# Patient Record
Sex: MALE | Race: BLACK OR AFRICAN AMERICAN | NOT HISPANIC OR LATINO | Employment: OTHER | URBAN - METROPOLITAN AREA
[De-identification: names, ages, dates, MRNs, and addresses within clinical notes are randomized per-mention and may not be internally consistent; named-entity substitution may affect disease eponyms.]

---

## 2023-09-26 ENCOUNTER — HOSPITAL ENCOUNTER (OUTPATIENT)
Dept: DATA CONVERSION | Facility: HOSPITAL | Age: 88
End: 2023-09-26
Attending: INTERNAL MEDICINE | Admitting: INTERNAL MEDICINE
Payer: MEDICARE

## 2023-09-26 DIAGNOSIS — I50.9 HEART FAILURE, UNSPECIFIED (MULTI): ICD-10-CM

## 2023-09-26 DIAGNOSIS — Z45.09 ENCOUNTER FOR ADJUSTMENT AND MANAGEMENT OF OTHER CARDIAC DEVICE: ICD-10-CM

## 2023-09-26 DIAGNOSIS — I47.20 VENTRICULAR TACHYCARDIA, UNSPECIFIED (MULTI): ICD-10-CM

## 2023-09-26 DIAGNOSIS — Z95.810 PRESENCE OF AUTOMATIC (IMPLANTABLE) CARDIAC DEFIBRILLATOR: ICD-10-CM

## 2023-09-26 LAB
ACTIVATED PARTIAL THROMBOPLASTIN TIME IN PPP BY COAGULATION ASSAY: 31 SEC (ref 27–38)
ANION GAP IN SER/PLAS: 10 MMOL/L (ref 10–20)
CALCIUM (MG/DL) IN SER/PLAS: 8.9 MG/DL (ref 8.6–10.3)
CARBON DIOXIDE, TOTAL (MMOL/L) IN SER/PLAS: 26 MMOL/L (ref 21–32)
CHLORIDE (MMOL/L) IN SER/PLAS: 107 MMOL/L (ref 98–107)
CREATININE (MG/DL) IN SER/PLAS: 1.47 MG/DL (ref 0.5–1.3)
ERYTHROCYTE DISTRIBUTION WIDTH (RATIO) BY AUTOMATED COUNT: 13.2 % (ref 11.5–14.5)
ERYTHROCYTE MEAN CORPUSCULAR HEMOGLOBIN CONCENTRATION (G/DL) BY AUTOMATED: 33.1 G/DL (ref 32–36)
ERYTHROCYTE MEAN CORPUSCULAR VOLUME (FL) BY AUTOMATED COUNT: 107 FL (ref 80–100)
ERYTHROCYTES (10*6/UL) IN BLOOD BY AUTOMATED COUNT: 2.71 X10E12/L (ref 4.5–5.9)
GFR MALE: 45 ML/MIN/1.73M2
GLUCOSE (MG/DL) IN SER/PLAS: 86 MG/DL (ref 74–99)
HEMATOCRIT (%) IN BLOOD BY AUTOMATED COUNT: 29 % (ref 41–52)
HEMOGLOBIN (G/DL) IN BLOOD: 9.6 G/DL (ref 13.5–17.5)
INR IN PPP BY COAGULATION ASSAY: 1.2 (ref 0.9–1.1)
LEUKOCYTES (10*3/UL) IN BLOOD BY AUTOMATED COUNT: 10.9 X10E9/L (ref 4.4–11.3)
PLATELETS (10*3/UL) IN BLOOD AUTOMATED COUNT: 210 X10E9/L (ref 150–450)
POTASSIUM (MMOL/L) IN SER/PLAS: 4.1 MMOL/L (ref 3.5–5.3)
PROTHROMBIN TIME (PT) IN PPP BY COAGULATION ASSAY: 13 SEC (ref 9.8–12.8)
SODIUM (MMOL/L) IN SER/PLAS: 139 MMOL/L (ref 136–145)
UREA NITROGEN (MG/DL) IN SER/PLAS: 24 MG/DL (ref 6–23)

## 2023-09-29 VITALS — BODY MASS INDEX: 24.18 KG/M2 | WEIGHT: 145.28 LBS

## 2023-09-30 NOTE — H&P
History & Physical Reviewed:   I have reviewed the History and Physical dated:  08-Sep-2023   History and Physical reviewed and relevant findings noted. Patient examined to review pertinent physical  findings.: No significant changes   Home Medications Reviewed: no changes noted   Allergies Reviewed: no changes noted       Airway/Sedation Assessment:  ·  Mouth Opening OK yes   ·  Neck Flexibility OK yes   ·  Loose Teeth no   ·  Oropharyngeal Classification Class II       ERAS (Enhanced Recovery After Surgery):  ·  ERAS Patient: no     Consent:   COVID-19 Consent:  ·  COVID-19 Risk Consent Surgeon has reviewed key risks related to the risk of vivian COVID-19 and if they contract COVID-19 what the risks are.       Electronic Signatures:  Michael Rivera)  (Signed 26-Sep-2023 09:20)   Authored: History & Physical Reviewed, Airway/Sedation,  ERAS, Consent, Note Completion      Last Updated: 26-Sep-2023 09:20 by Michael Rivera)

## 2023-10-01 PROBLEM — Z95.810 ICD (IMPLANTABLE CARDIOVERTER-DEFIBRILLATOR) IN PLACE: Status: ACTIVE | Noted: 2023-10-01

## 2023-10-01 PROBLEM — M50.30 DDD (DEGENERATIVE DISC DISEASE), CERVICAL: Status: ACTIVE | Noted: 2023-10-01

## 2023-10-01 PROBLEM — R42 DIZZINESS: Status: ACTIVE | Noted: 2023-10-01

## 2023-10-01 PROBLEM — E87.5 HYPERKALEMIA: Status: ACTIVE | Noted: 2023-10-01

## 2023-10-01 PROBLEM — N18.31 STAGE 3A CHRONIC KIDNEY DISEASE (MULTI): Status: ACTIVE | Noted: 2023-10-01

## 2023-10-01 PROBLEM — G47.00 INSOMNIA: Status: ACTIVE | Noted: 2023-10-01

## 2023-10-01 PROBLEM — G89.29 CHRONIC RIGHT SHOULDER PAIN: Status: ACTIVE | Noted: 2023-10-01

## 2023-10-01 PROBLEM — K21.9 GERD (GASTROESOPHAGEAL REFLUX DISEASE): Status: ACTIVE | Noted: 2023-10-01

## 2023-10-01 PROBLEM — I10 HYPERTENSION: Status: ACTIVE | Noted: 2023-10-01

## 2023-10-01 PROBLEM — R07.9 CHEST PAIN: Status: ACTIVE | Noted: 2023-10-01

## 2023-10-01 PROBLEM — N28.9 RENAL INSUFFICIENCY: Status: ACTIVE | Noted: 2023-10-01

## 2023-10-01 PROBLEM — K59.09 CHRONIC CONSTIPATION: Status: ACTIVE | Noted: 2023-10-01

## 2023-10-01 PROBLEM — Z95.0 PACEMAKER: Status: ACTIVE | Noted: 2023-10-01

## 2023-10-01 PROBLEM — Z91.81 AT RISK FOR FALL DUE TO COMORBID CONDITION: Status: ACTIVE | Noted: 2023-10-01

## 2023-10-01 PROBLEM — D64.9 ANEMIA: Status: ACTIVE | Noted: 2023-10-01

## 2023-10-01 PROBLEM — G40.909 SEIZURE DISORDER (MULTI): Status: ACTIVE | Noted: 2023-10-01

## 2023-10-01 PROBLEM — M25.511 CHRONIC RIGHT SHOULDER PAIN: Status: ACTIVE | Noted: 2023-10-01

## 2023-10-01 PROBLEM — G62.9 PERIPHERAL NEUROPATHY: Status: ACTIVE | Noted: 2023-10-01

## 2023-10-01 PROBLEM — I10 ORTHOSTATIC HYPERTENSION: Status: ACTIVE | Noted: 2023-10-01

## 2023-10-01 PROBLEM — N40.0 BENIGN ENLARGEMENT OF PROSTATE: Status: ACTIVE | Noted: 2023-10-01

## 2023-10-01 PROBLEM — N18.2 CHRONIC KIDNEY DISEASE, STAGE 2 (MILD): Status: ACTIVE | Noted: 2023-10-01

## 2023-10-01 PROBLEM — I50.1 CHRONIC LEFT-SIDED CHF (CONGESTIVE HEART FAILURE) (MULTI): Status: ACTIVE | Noted: 2023-10-01

## 2023-10-01 PROBLEM — I25.10 CAD (CORONARY ARTERY DISEASE): Status: ACTIVE | Noted: 2023-10-01

## 2023-10-01 PROBLEM — M25.611 STIFFNESS OF RIGHT SHOULDER JOINT: Status: ACTIVE | Noted: 2023-10-01

## 2023-10-01 PROBLEM — I42.9 CARDIOMYOPATHY (MULTI): Status: ACTIVE | Noted: 2023-10-01

## 2023-10-01 PROBLEM — I47.29 PAROXYSMAL VENTRICULAR TACHYCARDIA (MULTI): Status: ACTIVE | Noted: 2023-10-01

## 2023-10-01 PROBLEM — R13.13 DYSPHAGIA, CRICOPHARYNGEAL: Status: ACTIVE | Noted: 2023-10-01

## 2023-10-01 PROBLEM — F41.8 ANXIETY WITH DEPRESSION: Status: ACTIVE | Noted: 2023-10-01

## 2023-10-01 PROBLEM — F03.B0 MODERATE DEMENTIA (MULTI): Status: ACTIVE | Noted: 2023-10-01

## 2023-10-01 PROBLEM — M75.00 FROZEN SHOULDER: Status: ACTIVE | Noted: 2023-10-01

## 2023-10-01 PROBLEM — N28.9 KIDNEY DAMAGE: Status: ACTIVE | Noted: 2023-10-01

## 2023-10-01 PROBLEM — R03.0 ELEVATED BLOOD PRESSURE READING: Status: ACTIVE | Noted: 2023-10-01

## 2023-10-01 PROBLEM — I48.91 ATRIAL FIBRILLATION (MULTI): Status: ACTIVE | Noted: 2023-10-01

## 2023-10-01 PROBLEM — I77.9 BILATERAL CAROTID ARTERY DISEASE (CMS-HCC): Status: ACTIVE | Noted: 2023-10-01

## 2023-10-01 PROBLEM — I47.20 PAROXYSMAL VENTRICULAR TACHYCARDIA: Status: ACTIVE | Noted: 2023-10-01

## 2023-10-01 PROBLEM — E61.1 IRON DEFICIENCY: Status: ACTIVE | Noted: 2023-10-01

## 2023-10-01 PROBLEM — F45.9 PSYCHOSOMATIC DISORDER: Status: ACTIVE | Noted: 2023-10-01

## 2023-10-01 PROBLEM — E53.8 VITAMIN B12 DEFICIENCY: Status: ACTIVE | Noted: 2023-10-01

## 2023-10-01 PROBLEM — I50.20 SYSTOLIC CONGESTIVE HEART FAILURE, NYHA CLASS 2 (MULTI): Status: ACTIVE | Noted: 2023-10-01

## 2023-10-01 PROBLEM — E55.9 VITAMIN D DEFICIENCY DISEASE: Status: ACTIVE | Noted: 2023-10-01

## 2023-10-01 PROBLEM — F41.9 ANXIETY: Status: ACTIVE | Noted: 2023-10-01

## 2023-10-01 PROBLEM — R29.6 UNWITNESSED FALL: Status: ACTIVE | Noted: 2023-10-01

## 2023-10-01 PROBLEM — R09.89 LABILE HYPERTENSION: Status: ACTIVE | Noted: 2023-10-01

## 2023-10-01 PROBLEM — R33.9 URINARY RETENTION: Status: ACTIVE | Noted: 2023-10-01

## 2023-10-01 PROBLEM — I95.9 HYPOTENSION (ARTERIAL): Status: ACTIVE | Noted: 2023-10-01

## 2023-10-01 RX ORDER — HYDRALAZINE HYDROCHLORIDE 50 MG/1
50 TABLET, FILM COATED ORAL 2 TIMES DAILY
COMMUNITY
Start: 2022-12-07 | End: 2023-11-24 | Stop reason: SDUPTHER

## 2023-10-01 RX ORDER — PHENAZOPYRIDINE HYDROCHLORIDE 200 MG/1
200 TABLET, FILM COATED ORAL 3 TIMES DAILY
COMMUNITY
Start: 2022-04-15 | End: 2023-10-18 | Stop reason: ALTCHOICE

## 2023-10-01 RX ORDER — PANTOPRAZOLE SODIUM 40 MG/1
1 TABLET, DELAYED RELEASE ORAL DAILY
COMMUNITY
End: 2024-03-19 | Stop reason: SDUPTHER

## 2023-10-01 RX ORDER — HYDRALAZINE HYDROCHLORIDE 100 MG/1
100 TABLET, FILM COATED ORAL 2 TIMES DAILY
COMMUNITY
End: 2023-10-18 | Stop reason: ALTCHOICE

## 2023-10-01 RX ORDER — ASPIRIN 325 MG
1 TABLET, DELAYED RELEASE (ENTERIC COATED) ORAL
COMMUNITY
Start: 2022-07-19

## 2023-10-01 RX ORDER — ATORVASTATIN CALCIUM 80 MG/1
1 TABLET, FILM COATED ORAL NIGHTLY
COMMUNITY
Start: 2022-01-18 | End: 2024-02-21 | Stop reason: SDUPTHER

## 2023-10-01 RX ORDER — NITROGLYCERIN 0.4 MG/1
0.4 TABLET SUBLINGUAL EVERY 5 MIN PRN
COMMUNITY
End: 2024-02-21 | Stop reason: SDUPTHER

## 2023-10-01 RX ORDER — FUROSEMIDE 20 MG/1
1 TABLET ORAL DAILY
COMMUNITY
Start: 2022-12-07 | End: 2023-10-18 | Stop reason: ALTCHOICE

## 2023-10-01 RX ORDER — ONDANSETRON 4 MG/1
4 TABLET, FILM COATED ORAL 3 TIMES DAILY PRN
COMMUNITY

## 2023-10-01 RX ORDER — MIRTAZAPINE 15 MG/1
15 TABLET, FILM COATED ORAL NIGHTLY
COMMUNITY

## 2023-10-01 RX ORDER — HYDROCODONE BITARTRATE AND ACETAMINOPHEN 5; 325 MG/1; MG/1
1 TABLET ORAL AS NEEDED
COMMUNITY
End: 2023-10-18 | Stop reason: ALTCHOICE

## 2023-10-01 RX ORDER — FOLIC ACID 1 MG/1
1 TABLET ORAL DAILY
COMMUNITY

## 2023-10-01 RX ORDER — ACETAMINOPHEN 500 MG
500 TABLET ORAL AS NEEDED
COMMUNITY
End: 2023-10-18 | Stop reason: ALTCHOICE

## 2023-10-01 RX ORDER — CARVEDILOL 6.25 MG/1
1 TABLET ORAL
COMMUNITY
End: 2024-02-21 | Stop reason: SDUPTHER

## 2023-10-01 RX ORDER — MECLIZINE HYDROCHLORIDE 25 MG/1
1 TABLET ORAL 3 TIMES DAILY PRN
COMMUNITY
End: 2023-10-18 | Stop reason: ALTCHOICE

## 2023-10-01 RX ORDER — PEPPERMINT OIL 90 MG
1 CAPSULE, DELAYED, AND EXTENDED RELEASE ORAL EVERY MORNING
COMMUNITY

## 2023-10-01 RX ORDER — DIGOXIN 125 MCG
1 TABLET ORAL DAILY
COMMUNITY
End: 2024-01-15 | Stop reason: SDUPTHER

## 2023-10-01 RX ORDER — DICYCLOMINE HYDROCHLORIDE 10 MG/1
10 CAPSULE ORAL EVERY 6 HOURS PRN
COMMUNITY
End: 2023-10-18 | Stop reason: ALTCHOICE

## 2023-10-01 RX ORDER — ASPIRIN 81 MG/1
1 TABLET ORAL DAILY
COMMUNITY
End: 2024-02-21 | Stop reason: SDUPTHER

## 2023-10-01 RX ORDER — DOCUSATE SODIUM 100 MG/1
100 CAPSULE, LIQUID FILLED ORAL DAILY PRN
COMMUNITY

## 2023-10-01 RX ORDER — DIVALPROEX SODIUM 500 MG/1
500 TABLET, DELAYED RELEASE ORAL 3 TIMES DAILY
COMMUNITY

## 2023-10-02 LAB
ATRIAL RATE: 71 BPM
P OFFSET: 185 MS
P ONSET: 158 MS
PR INTERVAL: 182 MS
Q ONSET: 199 MS
QRS COUNT: 11 BEATS
QRS DURATION: 140 MS
QT INTERVAL: 410 MS
QTC CALCULATION(BAZETT): 445 MS
QTC FREDERICIA: 433 MS
R AXIS: -83 DEGREES
T AXIS: 55 DEGREES
T OFFSET: 404 MS
VENTRICULAR RATE: 71 BPM

## 2023-10-03 ENCOUNTER — CLINICAL SUPPORT (OUTPATIENT)
Dept: CARDIOLOGY | Facility: CLINIC | Age: 88
End: 2023-10-03
Payer: MEDICARE

## 2023-10-03 DIAGNOSIS — I50.1 CHRONIC LEFT-SIDED CHF (CONGESTIVE HEART FAILURE) (MULTI): ICD-10-CM

## 2023-10-03 DIAGNOSIS — Z95.810 ICD (IMPLANTABLE CARDIOVERTER-DEFIBRILLATOR) IN PLACE: ICD-10-CM

## 2023-10-03 DIAGNOSIS — I42.9 CARDIOMYOPATHY, UNSPECIFIED TYPE (MULTI): ICD-10-CM

## 2023-10-03 NOTE — PROGRESS NOTES
Pt. here for wound check of ICD replacement by Dr. Rivera on 9/26/2023 at [facility]. R clavicular area is well approximated with no erythema or drainage. Pt. denies any fever or chills. Temp 97.8

## 2023-10-18 ENCOUNTER — OFFICE VISIT (OUTPATIENT)
Dept: PRIMARY CARE | Facility: CLINIC | Age: 88
End: 2023-10-18
Payer: MEDICARE

## 2023-10-18 VITALS
BODY MASS INDEX: 24.62 KG/M2 | HEART RATE: 88 BPM | WEIGHT: 147.8 LBS | SYSTOLIC BLOOD PRESSURE: 118 MMHG | DIASTOLIC BLOOD PRESSURE: 78 MMHG | HEIGHT: 65 IN | TEMPERATURE: 98.6 F

## 2023-10-18 DIAGNOSIS — I25.5 ISCHEMIC CARDIOMYOPATHY: ICD-10-CM

## 2023-10-18 DIAGNOSIS — I10 PRIMARY HYPERTENSION: ICD-10-CM

## 2023-10-18 DIAGNOSIS — I25.118 CORONARY ARTERY DISEASE OF NATIVE ARTERY OF NATIVE HEART WITH STABLE ANGINA PECTORIS (CMS-HCC): Primary | ICD-10-CM

## 2023-10-18 DIAGNOSIS — Z95.810 ICD (IMPLANTABLE CARDIOVERTER-DEFIBRILLATOR) IN PLACE: ICD-10-CM

## 2023-10-18 DIAGNOSIS — Z23 ENCOUNTER FOR IMMUNIZATION: ICD-10-CM

## 2023-10-18 PROCEDURE — 3078F DIAST BP <80 MM HG: CPT | Performed by: INTERNAL MEDICINE

## 2023-10-18 PROCEDURE — 3074F SYST BP LT 130 MM HG: CPT | Performed by: INTERNAL MEDICINE

## 2023-10-18 PROCEDURE — 99213 OFFICE O/P EST LOW 20 MIN: CPT | Performed by: INTERNAL MEDICINE

## 2023-10-18 PROCEDURE — 1036F TOBACCO NON-USER: CPT | Performed by: INTERNAL MEDICINE

## 2023-10-18 ASSESSMENT — PATIENT HEALTH QUESTIONNAIRE - PHQ9
2. FEELING DOWN, DEPRESSED OR HOPELESS: NOT AT ALL
1. LITTLE INTEREST OR PLEASURE IN DOING THINGS: NOT AT ALL
SUM OF ALL RESPONSES TO PHQ9 QUESTIONS 1 AND 2: 0

## 2023-10-18 ASSESSMENT — ENCOUNTER SYMPTOMS
LOSS OF SENSATION IN FEET: 0
OCCASIONAL FEELINGS OF UNSTEADINESS: 0
DEPRESSION: 0

## 2023-10-22 PROCEDURE — 90662 IIV NO PRSV INCREASED AG IM: CPT | Performed by: INTERNAL MEDICINE

## 2023-10-22 PROCEDURE — G0008 ADMIN INFLUENZA VIRUS VAC: HCPCS | Performed by: INTERNAL MEDICINE

## 2023-10-22 ASSESSMENT — ENCOUNTER SYMPTOMS
SORE THROAT: 0
COUGH: 0
DYSURIA: 0
ACTIVITY CHANGE: 0
MYALGIAS: 0
ABDOMINAL PAIN: 0
LIGHT-HEADEDNESS: 0

## 2023-10-23 NOTE — PROGRESS NOTES
"SUBJECTIVE:   Oneil William is a 89 y.o. male who presents for Follow-up (PATIENT HERE FOR ONE MONTH FOLLOW-UP.).    HISTORY OF PRESENT ILLNESS:  Oneil William  is a pleasant 89-year-old gentleman comes with his daughter.  He does have coronary disease, hypertension and cardiomyopathy.  He is overall doing well.  He does not have any acute medical complaint at this time.  Patient has nonspecific aches and pains especially bilateral shoulder joints.  He does have sedentary lifestyle.  We discussed about having some freehand exercises.  He does not need any medications refilled.  He sees cardiology mostly for his cardiac health.  He did not have any recent ER visit.  He is due for his annual flu shot.      Review of Systems   Constitutional:  Negative for activity change.   HENT:  Negative for congestion and sore throat.    Respiratory:  Negative for cough.    Cardiovascular:  Negative for chest pain.   Gastrointestinal:  Negative for abdominal pain.   Endocrine: Negative for polyuria.   Genitourinary:  Negative for dysuria.   Musculoskeletal:  Negative for myalgias.   Skin:  Negative for rash.   Neurological:  Negative for light-headedness.   Psychiatric/Behavioral:  Negative for behavioral problems.        TODAY's OFFICE VITALS:   Visit Vitals  /78 (BP Location: Left arm, Patient Position: Sitting, BP Cuff Size: Adult)   Pulse 88   Temp 37 °C (98.6 °F) (Temporal)   Ht 1.651 m (5' 5\")   Wt 67 kg (147 lb 12.8 oz)   BMI 24.60 kg/m²   Smoking Status Never   BSA 1.75 m²        Physical Exam  Vitals and nursing note reviewed.   Constitutional:       Appearance: Normal appearance.   HENT:      Head: Normocephalic.      Right Ear: Tympanic membrane normal.      Left Ear: Tympanic membrane normal.      Nose: Nose normal.      Mouth/Throat:      Mouth: Mucous membranes are moist.   Cardiovascular:      Rate and Rhythm: Normal rate and regular rhythm.      Pulses: Normal pulses.      Heart sounds: No murmur heard.  Pulmonary:      " Effort: No respiratory distress.      Breath sounds: Normal breath sounds.   Abdominal:      Palpations: Abdomen is soft.   Musculoskeletal:      Cervical back: Neck supple.      Right lower leg: No edema.      Left lower leg: No edema.   Skin:     General: Skin is warm.      Findings: No rash.   Neurological:      General: No focal deficit present.      Mental Status: He is alert and oriented to person, place, and time.   Psychiatric:         Mood and Affect: Mood normal.          MEDICATIONS:   Current Outpatient Medications on File Prior to Visit   Medication Sig Dispense Refill    aspirin 81 mg EC tablet Take 1 tablet (81 mg) by mouth once daily.      atorvastatin (Lipitor) 80 mg tablet Take 1 tablet (80 mg) by mouth once daily at bedtime.      amilcar oil-levomenthol (FDgard) 25-20.75 mg capsule Take 1 capsule by mouth once daily in the morning. may take additonal tablet in the evening if needed      carvedilol (Coreg) 6.25 mg tablet Take 1 tablet (6.25 mg) by mouth 2 times a day with meals.      cholecalciferol (Vitamin D-3) 1,250 mcg (50,000 unit) capsule Take 1 capsule (50,000 Units) by mouth 1 (one) time per week.      digoxin (Lanoxin) 125 MCG tablet Take 1 tablet (125 mcg) by mouth once daily.      divalproex (Depakote) 500 mg EC tablet Take 1 tablet (500 mg) by mouth 3 times a day. AFTER MEALS.      docusate sodium (Colace) 100 mg capsule Take 1 capsule (100 mg) by mouth once daily as needed.      folic acid (Folvite) 1 mg tablet Take 1 tablet (1 mg) by mouth once daily.      hydrALAZINE (Apresoline) 50 mg tablet Take 1 tablet (50 mg) by mouth 2 times a day. if Systolic BP >170 he can take 100mg bid if Systolic BP <100 Hold      mirtazapine (Remeron) 15 mg tablet Take 1 tablet (15 mg) by mouth once daily at bedtime.      multivit-min/ferrous fumarate (MULTI VITAMIN ORAL) Take 1 tablet by mouth once daily.      nitroglycerin (Nitrostat) 0.4 mg SL tablet Place 1 tablet (0.4 mg) under the tongue every 5  minutes if needed for chest pain. DO NOT EXCEED A TOTAL OF 3 DOSES IN 15 MINUTES. CALL 911 AFTER 3RD DOSE      ondansetron (Zofran) 4 mg tablet Take 1 tablet (4 mg) by mouth 3 times a day as needed for nausea.      pantoprazole (ProtoNix) 40 mg EC tablet Take 1 tablet (40 mg) by mouth once daily.      [DISCONTINUED] acetaminophen (Mapap Extra Strength) 500 mg tablet Take 1 tablet (500 mg) by mouth if needed. EVERY 4 TO 6 HOURS      [DISCONTINUED] dicyclomine (Bentyl) 10 mg capsule Take 1 capsule (10 mg) by mouth every 6 hours if needed.      [DISCONTINUED] furosemide (Lasix) 20 mg tablet Take 1 tablet (20 mg) by mouth once daily.      [DISCONTINUED] hydrALAZINE (Apresoline) 100 mg tablet Take 1 tablet (100 mg) by mouth 2 times a day.      [DISCONTINUED] HYDROcodone-acetaminophen (Norco) 5-325 mg tablet Take 1 tablet by mouth if needed (pain). Every 4 to 6 hours      [DISCONTINUED] meclizine (Antivert) 25 mg tablet Take 1 tablet (25 mg) by mouth 3 times a day as needed.      [DISCONTINUED] phenazopyridine (Pyridium) 200 mg tablet Take 1 tablet (200 mg) by mouth 3 times a day.       No current facility-administered medications on file prior to visit.        TODAY'S VISIT  DX:   1. Coronary artery disease of native artery of native heart with stable angina pectoris (CMS/HCC)        2. Encounter for immunization  Flu vaccine, quadrivalent, high-dose, preservative free, age 65y+ (FLUZONE)      3. Primary hypertension        4. ICD (implantable cardioverter-defibrillator) in place        5. Ischemic cardiomyopathy               MEDICAL DECISION MAKING:  Recent lab work and relevant imaging studies have been reviewed.  Relevant correspondence/notes from specialty consultants were reviewed and discussed with patient.  The current active medical co morbidities have been considered.  Patient received annual flu vaccine today in the office.. Medication have been sent for refill.  Patient will continue with current medical  therapy and plan. I will see patient back in 3 months.

## 2023-10-26 ENCOUNTER — TELEMEDICINE (OUTPATIENT)
Dept: PRIMARY CARE | Facility: CLINIC | Age: 88
End: 2023-10-26
Payer: MEDICARE

## 2023-10-26 DIAGNOSIS — I10 PRIMARY HYPERTENSION: Primary | ICD-10-CM

## 2023-10-26 PROCEDURE — 99442 PR PHYS/QHP TELEPHONE EVALUATION 11-20 MIN: CPT | Performed by: INTERNAL MEDICINE

## 2023-10-26 ASSESSMENT — PATIENT HEALTH QUESTIONNAIRE - PHQ9
SUM OF ALL RESPONSES TO PHQ9 QUESTIONS 1 AND 2: 0
2. FEELING DOWN, DEPRESSED OR HOPELESS: NOT AT ALL
1. LITTLE INTEREST OR PLEASURE IN DOING THINGS: NOT AT ALL

## 2023-11-01 ASSESSMENT — ENCOUNTER SYMPTOMS
PALPITATIONS: 1
MYALGIAS: 0
LIGHT-HEADEDNESS: 0
COUGH: 0
SORE THROAT: 0
ACTIVITY CHANGE: 0
DYSURIA: 0
ABDOMINAL PAIN: 0

## 2023-11-02 NOTE — PROGRESS NOTES
In SUBJECTIVE:   Oneil William is a 89 y.o. male who presents for Palpitations (Patient having telecommunication today for palpitations since getting his flu shot last week. ).    HISTORY OF PRESENT ILLNESS:  Oneil William  is a pleasant 89-year-old -American gentleman has been having palpitation.  Sometimes he gets anxious and his heart races.  He has multiple ER visits for similar symptoms.  This time he does not have any chest pain.  His daughter checked his blood pressure which has been slightly elevated at 160 mmHg.  He took his morning antihypertensive medications.  However blood pressure remained high.  This is making him also somewhat anxious.  He sees Dr. Dillon for his cardiac need.  Patient does have low ejection fraction congestive heart failure.  He has a AICD in situ.    Palpitations   Pertinent negatives include no chest pain or coughing.       Review of Systems   Constitutional:  Negative for activity change.   HENT:  Negative for congestion and sore throat.    Respiratory:  Negative for cough.    Cardiovascular:  Positive for palpitations. Negative for chest pain.   Gastrointestinal:  Negative for abdominal pain.   Endocrine: Negative for polyuria.   Genitourinary:  Negative for dysuria.   Musculoskeletal:  Negative for myalgias.   Skin:  Negative for rash.   Neurological:  Negative for light-headedness.   Psychiatric/Behavioral:  Negative for behavioral problems.         Wt Readings from Last 10 Encounters:   10/18/23 67 kg (147 lb 12.8 oz)   06/26/23 68.9 kg (152 lb)   06/06/23 68 kg (150 lb)   05/09/23 69.9 kg (154 lb)   05/04/23 68.9 kg (152 lb)   04/27/23 67.1 kg (148 lb)   04/03/23 68.5 kg (151 lb)   02/21/23 67.6 kg (149 lb)   02/02/23 65.3 kg (144 lb)   01/10/23 68 kg (150 lb)            Physical Exam  Constitutional:       General: He is not in acute distress.  HENT:      Nose: No rhinorrhea.   Pulmonary:      Effort: Pulmonary effort is normal. No respiratory distress.      Breath sounds: No  stridor. No wheezing.   Neurological:      Mental Status: He is alert.   Psychiatric:         Mood and Affect: Mood normal.         Judgment: Judgment normal.            RECENT LABS:  Lab Results   Component Value Date    WBC 10.9 09/26/2023    HGB 9.6 (L) 09/26/2023    HCT 29.0 (L) 09/26/2023     09/26/2023     09/26/2023    K 4.1 09/26/2023     09/26/2023    CREATININE 1.47 (H) 09/26/2023    BUN 24 (H) 09/26/2023    CO2 26 09/26/2023    INR 1.2 (H) 09/26/2023         MEDICATIONS:   Current Outpatient Medications   Medication Instructions    aspirin 81 mg EC tablet 1 tablet, oral, Daily    atorvastatin (Lipitor) 80 mg tablet 1 tablet, oral, Nightly    amilcar oil-levomenthol (FDgard) 25-20.75 mg capsule 1 capsule, oral, Every morning, may take additonal tablet in the evening if needed<BR>    carvedilol (Coreg) 6.25 mg tablet 1 tablet, oral, 2 times daily with meals    cholecalciferol (Vitamin D-3) 1,250 mcg (50,000 unit) capsule 1 capsule, oral, Weekly    digoxin (Lanoxin) 125 MCG tablet 1 tablet, oral, Daily    divalproex (DEPAKOTE) 500 mg, oral, 3 times daily, AFTER MEALS.    docusate sodium (COLACE) 100 mg, oral, Daily PRN    folic acid (Folvite) 1 mg tablet 1 tablet, oral, Daily    hydrALAZINE (APRESOLINE) 50 mg, oral, 2 times daily, if Systolic BP >170 he can take 100mg bid if Systolic BP <100 Hold<BR>    mirtazapine (REMERON) 15 mg, oral, Nightly    multivit-min/ferrous fumarate (MULTI VITAMIN ORAL) 1 tablet, oral, Daily    nitroglycerin (NITROSTAT) 0.4 mg, sublingual, Every 5 min PRN, DO NOT EXCEED A TOTAL OF 3 DOSES IN 15 MINUTES. CALL 911 AFTER 3RD DOSE    ondansetron (ZOFRAN) 4 mg, oral, 3 times daily PRN    pantoprazole (ProtoNix) 40 mg EC tablet 1 tablet, oral, Daily        TODAY'S VISIT  DX:   1. Primary hypertension               MEDICAL DECISION MAKING:  A. Recent lab work and relevant imaging studies have been reviewed.    B. Relevant correspondence/notes from specialty  consultants were reviewed and discussed with patient.    C. The current active medical co morbidities have been considered.   D.  Patient was reassured.  I advised him to take 1 extra pill of hydralazine 100 mg.  I will give him a call at about 4 PM to recheck his blood pressure.  E. Patient will continue with current medical therapy and plan.   F. I will see patient back in 3 months.

## 2023-11-14 ENCOUNTER — APPOINTMENT (OUTPATIENT)
Dept: CARDIOLOGY | Facility: CLINIC | Age: 88
End: 2023-11-14
Payer: MEDICARE

## 2023-11-24 ENCOUNTER — OFFICE VISIT (OUTPATIENT)
Dept: CARDIOLOGY | Facility: CLINIC | Age: 88
End: 2023-11-24
Payer: MEDICARE

## 2023-11-24 VITALS
SYSTOLIC BLOOD PRESSURE: 166 MMHG | DIASTOLIC BLOOD PRESSURE: 72 MMHG | BODY MASS INDEX: 25.16 KG/M2 | HEIGHT: 65 IN | WEIGHT: 151 LBS | HEART RATE: 72 BPM

## 2023-11-24 DIAGNOSIS — Z95.810 ICD (IMPLANTABLE CARDIOVERTER-DEFIBRILLATOR) IN PLACE: ICD-10-CM

## 2023-11-24 DIAGNOSIS — I10 ORTHOSTATIC HYPERTENSION: ICD-10-CM

## 2023-11-24 DIAGNOSIS — R09.89 LABILE HYPERTENSION: Primary | ICD-10-CM

## 2023-11-24 DIAGNOSIS — N28.9 RENAL INSUFFICIENCY: ICD-10-CM

## 2023-11-24 PROCEDURE — 1036F TOBACCO NON-USER: CPT | Performed by: NURSE PRACTITIONER

## 2023-11-24 PROCEDURE — 1160F RVW MEDS BY RX/DR IN RCRD: CPT | Performed by: NURSE PRACTITIONER

## 2023-11-24 PROCEDURE — 99213 OFFICE O/P EST LOW 20 MIN: CPT | Performed by: NURSE PRACTITIONER

## 2023-11-24 PROCEDURE — 1159F MED LIST DOCD IN RCRD: CPT | Performed by: NURSE PRACTITIONER

## 2023-11-24 PROCEDURE — 3077F SYST BP >= 140 MM HG: CPT | Performed by: NURSE PRACTITIONER

## 2023-11-24 PROCEDURE — 3078F DIAST BP <80 MM HG: CPT | Performed by: NURSE PRACTITIONER

## 2023-11-24 RX ORDER — HYDRALAZINE HYDROCHLORIDE 50 MG/1
TABLET, FILM COATED ORAL
Qty: 30 TABLET | Refills: 6 | Status: SHIPPED | OUTPATIENT
Start: 2023-11-24 | End: 2024-02-21 | Stop reason: SDUPTHER

## 2023-11-24 NOTE — PROGRESS NOTES
Oneil William is a 89 y.o. male that presents to the office today with family member for  cardiac follow up.  He follows with his  primary cardiologist Dr. Dillon and Dr. Rivera  and was added to my schedule today.  PMH as noted below.       He states that his blood pressure has been elevated at home sometimes near 190 systolic which makes him feel poorly.  He states that he has been taking Hydralazine 50 mg PO three times per day but feels as though his evening blood pressure is always excessively elevated. He states that he is eating and drinking well, is avoiding sodium in his diet. He recently underwent ICD generator upgrade with Dr. Rivera 9/26/23.          PMH  1. Congestive heart failure functional class II/III  2.. Cardiac catheterization 11/2018 LVEDP 10 mmHg LVEF 40% left main 0% stenosis LAD 20% within previous stent in the distal vessel, mid LAD stent 20% stenosis, left circumflex 30% stenosis RCA less than 20% proximal and distal stenosis.  3. Has tendency for hyperkalemia, have to watch closely. Hyperkalemia was the reason for discontinuation of Entresto  4. Chronic kidney disease stage III  5. Tendency for orthostatic hypotension. Not orthostatic today, blood pressure is at target, patient feels well.  6. Degenerative joint disease, ambulates with a wheeled walker.  7. Increased frequency of urination, partly related to BPH  8. Echocardiogram March 3, 2021 LVEF 40 to 45%, normal RV size and function, trivial mitral regurgitation 1+ tricuspid regurgitation RVSP 30 mmHg trivial tricuspid regurgitation  9. Biventricular ICD, device interrogation April 2021 showed less than 1% atrial fibrillation burden longest duration 21 hours, this is addressed by EP service  10. Elevated D-dimer 0.7 6 June 2021-CT chest negative for pulmonary embolism  11. Status post injection right rotator cuff for pain.  12. Carotid ultrasound 2018 less than 50% bilateral carotid stenosis  12. Lexiscan Myoview March 2021 no ischemia LVEF  39%  13. chronically elevated troponin  14. seizure disorder   15. Orthostatic hypotension  16. ACE inhibitor/angiotensin receptor blocker/Entresto intolerance due to hyperkalemia  17. BPH  18. Frequent ER visits, for multitude of reasons, lately for accelerated hypertension, chronic dizziness, and now with complaints of chest pain and jaw discomfort. Did not try nitroglycerin but took Tylenol. Very difficult to assess clinically.  19. Agree with the fact that anxiety and/or depression are triggering several of the ER visits.  20. Lexiscan Myoview October 2022-no evidence of ischemia or infarction, LVEF 54%, LVEF was reported to be 39% in March 2021 transient ischemic dilatation 1.0 which is normal.      Testing Reviewed  Labs 9/26/2023: Na 139, K + 4.1, Creat 1.47, Hgb 9.6.      Assessment/Plan  Hypertension:  Sub optimal control in office today with reported high reading at home.  We will increase his hydralazine to 50 mg 1 tablet twice day AM and PM along with hydralazine 100 mg in afternoon.  He has been advised to monitor his blood pressure 1-2 hours after his afternoon dose.    ICD:  Follow with Dr. Rivera, pacemaker interrogations as scheduled.   Follow with Dr. Dillon as scheduled  Follow with Dr. Rivera as scheduled.       Patient Active Problem List   Diagnosis    DDD (degenerative disc disease), cervical    Anxiety    Anxiety with depression    Atrial fibrillation (CMS/HCC)    Benign enlargement of prostate    Bilateral carotid artery disease (CMS/HCC)    Cardiomyopathy (CMS/HCC)    Chest pain    Chronic constipation    Chronic kidney disease, stage 2 (mild)    Chronic left-sided CHF (congestive heart failure) (CMS/HCC)    Chronic right shoulder pain    Dizziness    Dysphagia, cricopharyngeal    GERD (gastroesophageal reflux disease)    Frozen shoulder    Hyperkalemia    Hypotension (arterial)    ICD (implantable cardioverter-defibrillator) in place    Labile hypertension    Moderate dementia (CMS/HCC)     Elevated blood pressure reading    Orthostatic hypertension    Pacemaker    Paroxysmal ventricular tachycardia (CMS/HCC)    Peripheral neuropathy    Kidney damage    Renal insufficiency    Seizure disorder (CMS/HCC)    Stage 3a chronic kidney disease (CMS/HCC)    Stiffness of right shoulder joint    Systolic congestive heart failure, NYHA class 2 (CMS/HCC)    Unwitnessed fall    Urinary retention    Vitamin B12 deficiency    Vitamin D deficiency disease    Anemia    At risk for fall due to comorbid condition    Body mass index (BMI) of 24.0 to 24.9 in adult    CAD (coronary artery disease)    Hypertension    Insomnia    Iron deficiency    Psychosomatic disorder       Social History     Tobacco Use    Smoking status: Never    Smokeless tobacco: Never   Substance Use Topics    Alcohol use: Not Currently    Drug use: Not Currently       Past Medical History:   Diagnosis Date    Encounter for follow-up examination after completed treatment for conditions other than malignant neoplasm 07/11/2022    Hospital discharge follow-up    Encounter for follow-up examination after completed treatment for conditions other than malignant neoplasm 05/04/2022    Hospital discharge follow-up    Hypertension     ICD (implantable cardioverter-defibrillator) in place     Insomnia disorder     Personal history of other diseases of the circulatory system 12/07/2022    History of hypertension    Personal history of other endocrine, nutritional and metabolic disease 12/07/2022    History of hyperlipidemia         Current Outpatient Medications:     aspirin 81 mg EC tablet, Take 1 tablet (81 mg) by mouth once daily., Disp: , Rfl:     atorvastatin (Lipitor) 80 mg tablet, Take 1 tablet (80 mg) by mouth once daily at bedtime., Disp: , Rfl:     amilcar oil-levomenthol (FDgard) 25-20.75 mg capsule, Take 1 capsule by mouth once daily in the morning. may take additonal tablet in the evening if needed, Disp: , Rfl:     carvedilol (Coreg) 6.25 mg  tablet, Take 1 tablet (6.25 mg) by mouth 2 times a day with meals., Disp: , Rfl:     cholecalciferol (Vitamin D-3) 1,250 mcg (50,000 unit) capsule, Take 1 capsule (50,000 Units) by mouth 1 (one) time per week., Disp: , Rfl:     digoxin (Lanoxin) 125 MCG tablet, Take 1 tablet (125 mcg) by mouth once daily., Disp: , Rfl:     divalproex (Depakote) 500 mg EC tablet, Take 1 tablet (500 mg) by mouth 3 times a day. AFTER MEALS., Disp: , Rfl:     docusate sodium (Colace) 100 mg capsule, Take 1 capsule (100 mg) by mouth once daily as needed., Disp: , Rfl:     folic acid (Folvite) 1 mg tablet, Take 1 tablet (1 mg) by mouth once daily., Disp: , Rfl:     mirtazapine (Remeron) 15 mg tablet, Take 1 tablet (15 mg) by mouth once daily at bedtime., Disp: , Rfl:     multivit-min/ferrous fumarate (MULTI VITAMIN ORAL), Take 1 tablet by mouth once daily., Disp: , Rfl:     nitroglycerin (Nitrostat) 0.4 mg SL tablet, Place 1 tablet (0.4 mg) under the tongue every 5 minutes if needed for chest pain. DO NOT EXCEED A TOTAL OF 3 DOSES IN 15 MINUTES. CALL 911 AFTER 3RD DOSE, Disp: , Rfl:     ondansetron (Zofran) 4 mg tablet, Take 1 tablet (4 mg) by mouth 3 times a day as needed for nausea., Disp: , Rfl:     pantoprazole (ProtoNix) 40 mg EC tablet, Take 1 tablet (40 mg) by mouth once daily., Disp: , Rfl:     hydrALAZINE (Apresoline) 50 mg tablet, Take ( 1) 50 mg tablet twice day one in AM and one at bedtime,  Take (2) 50 mg tablets in afternoon to total 100 mg, Disp: 30 tablet, Rfl: 6    Codeine, Lisinopril, and Penicillins    Family History   Problem Relation Name Age of Onset    Cancer Father      Other (cardiac disorder) Sister         Past Surgical History:   Procedure Laterality Date    CT ANGIO NECK  3/10/2023    CT NECK ANGIO W AND WO IV CONTRAST 3/10/2023    CT HEAD ANGIO W AND WO IV CONTRAST  3/10/2023    CT HEAD ANGIO W AND WO IV CONTRAST 3/10/2023    OTHER SURGICAL HISTORY  11/10/2021    Surgery    OTHER SURGICAL HISTORY  11/10/2021  "   Cataract surgery    OTHER SURGICAL HISTORY  11/10/2021    Inguinal hernia repair    OTHER SURGICAL HISTORY  11/10/2021    Pacemaker insertion    OTHER SURGICAL HISTORY  11/10/2021    Esophagogastroduodenoscopy    OTHER SURGICAL HISTORY  05/04/2022    Prostate surgery    OTHER SURGICAL HISTORY  08/30/2022    Pancreatectomy    OTHER SURGICAL HISTORY  08/30/2022    Prostatectomy    OTHER SURGICAL HISTORY  03/29/2022    Pancreatic surgery    OTHER SURGICAL HISTORY  03/03/2022    Appendectomy          Review of systems  Constitutional: No weight loss, fever, chills, weakness or fatigue  HEENT: No visual loss, blurred vision, double vision or yellow sclerae  Skin: No rash or itching  Cardiovascular: No chest pain, pressure or discomfort, No palpitations or edema.  Respiratory: No shortness of breath, cough or sputum  Gastrointestinal: No nausea, vomiting or diarrhea. No bloody or dark tarry stools.  Neurological: No headache, lightheadedness, dizziness, syncope.   Musculoskeletal: No muscle, back pain, joint pain or stiffness.  Hematologic: No anemia, bleeding or bruising.    /72 (BP Location: Right arm, Patient Position: Sitting)   Pulse 72   Ht 1.651 m (5' 5\")   Wt 68.5 kg (151 lb)   BMI 25.13 kg/m²     Patient Active Problem List   Diagnosis    DDD (degenerative disc disease), cervical    Anxiety    Anxiety with depression    Atrial fibrillation (CMS/HCC)    Benign enlargement of prostate    Bilateral carotid artery disease (CMS/HCC)    Cardiomyopathy (CMS/HCC)    Chest pain    Chronic constipation    Chronic kidney disease, stage 2 (mild)    Chronic left-sided CHF (congestive heart failure) (CMS/HCC)    Chronic right shoulder pain    Dizziness    Dysphagia, cricopharyngeal    GERD (gastroesophageal reflux disease)    Frozen shoulder    Hyperkalemia    Hypotension (arterial)    ICD (implantable cardioverter-defibrillator) in place    Labile hypertension    Moderate dementia (CMS/HCC)    Elevated blood " pressure reading    Orthostatic hypertension    Pacemaker    Paroxysmal ventricular tachycardia (CMS/HCC)    Peripheral neuropathy    Kidney damage    Renal insufficiency    Seizure disorder (CMS/HCC)    Stage 3a chronic kidney disease (CMS/HCC)    Stiffness of right shoulder joint    Systolic congestive heart failure, NYHA class 2 (CMS/HCC)    Unwitnessed fall    Urinary retention    Vitamin B12 deficiency    Vitamin D deficiency disease    Anemia    At risk for fall due to comorbid condition    Body mass index (BMI) of 24.0 to 24.9 in adult    CAD (coronary artery disease)    Hypertension    Insomnia    Iron deficiency    Psychosomatic disorder       Physical Exam  Constitutional: Well developed, awake/alert x 3, no distress.  Respiratory/Thorax: patent airways, CTAB, normal breath sounds with good expansion.  Cardiovascular: Regular rate and rhythm, no murmurs, normal S1 and S2,   Gastrointestinal: Non distended, soft, non-tender, no rebound tenderness or guarding.  Extremities: No cyanosis, edema.    Neurological: Alert and oriented x 3. Moves extremities spontaneous with purpose.  Psychological: Appropriate mood and behavior  Skin: Warm and Dry. No lesions or rashes.         Please excuse any errors in grammar or translation related to dictation, voice recognition software was used to prepare this document.

## 2023-11-24 NOTE — PATIENT INSTRUCTIONS
HYDRALAZINE  Take ( 1) 50 mg tablet twice day one in AM and one at bedtime,  Take (2) 50 mg tablets in afternoon to total 100 mg    Please check your blood pressure at home every afternoon about 1-2 hours after taking you hydralazine.

## 2023-12-04 ENCOUNTER — APPOINTMENT (OUTPATIENT)
Dept: CARDIOLOGY | Facility: CLINIC | Age: 88
End: 2023-12-04
Payer: MEDICARE

## 2023-12-05 ENCOUNTER — APPOINTMENT (OUTPATIENT)
Dept: PRIMARY CARE | Facility: CLINIC | Age: 88
End: 2023-12-05
Payer: MEDICARE

## 2023-12-05 ENCOUNTER — OFFICE VISIT (OUTPATIENT)
Dept: PRIMARY CARE | Facility: CLINIC | Age: 88
End: 2023-12-05
Payer: MEDICARE

## 2023-12-05 VITALS
DIASTOLIC BLOOD PRESSURE: 86 MMHG | BODY MASS INDEX: 24.66 KG/M2 | HEIGHT: 65 IN | SYSTOLIC BLOOD PRESSURE: 148 MMHG | OXYGEN SATURATION: 98 % | TEMPERATURE: 98.1 F | HEART RATE: 70 BPM | WEIGHT: 148 LBS

## 2023-12-05 DIAGNOSIS — I10 PRIMARY HYPERTENSION: ICD-10-CM

## 2023-12-05 DIAGNOSIS — F03.B0 MODERATE DEMENTIA WITHOUT BEHAVIORAL DISTURBANCE, PSYCHOTIC DISTURBANCE, MOOD DISTURBANCE, OR ANXIETY, UNSPECIFIED DEMENTIA TYPE (MULTI): ICD-10-CM

## 2023-12-05 DIAGNOSIS — Z09 HOSPITAL DISCHARGE FOLLOW-UP: Primary | ICD-10-CM

## 2023-12-05 DIAGNOSIS — G44.201 ACUTE INTRACTABLE TENSION-TYPE HEADACHE: ICD-10-CM

## 2023-12-05 PROBLEM — I49.9 CARDIAC DYSRHYTHMIA: Status: ACTIVE | Noted: 2018-12-07

## 2023-12-05 PROBLEM — R51.9 HEADACHE: Status: ACTIVE | Noted: 2017-07-23

## 2023-12-05 PROBLEM — I50.42 CHRONIC COMBINED SYSTOLIC AND DIASTOLIC CONGESTIVE HEART FAILURE (MULTI): Status: ACTIVE | Noted: 2018-12-07

## 2023-12-05 PROBLEM — R27.0 ATAXIA: Status: ACTIVE | Noted: 2020-07-20

## 2023-12-05 PROCEDURE — 3079F DIAST BP 80-89 MM HG: CPT | Performed by: INTERNAL MEDICINE

## 2023-12-05 PROCEDURE — 1160F RVW MEDS BY RX/DR IN RCRD: CPT | Performed by: INTERNAL MEDICINE

## 2023-12-05 PROCEDURE — 1159F MED LIST DOCD IN RCRD: CPT | Performed by: INTERNAL MEDICINE

## 2023-12-05 PROCEDURE — 1157F ADVNC CARE PLAN IN RCRD: CPT | Performed by: INTERNAL MEDICINE

## 2023-12-05 PROCEDURE — 99214 OFFICE O/P EST MOD 30 MIN: CPT | Performed by: INTERNAL MEDICINE

## 2023-12-05 PROCEDURE — 1036F TOBACCO NON-USER: CPT | Performed by: INTERNAL MEDICINE

## 2023-12-05 PROCEDURE — 3077F SYST BP >= 140 MM HG: CPT | Performed by: INTERNAL MEDICINE

## 2023-12-05 RX ORDER — KETOROLAC TROMETHAMINE 30 MG/ML
30 INJECTION, SOLUTION INTRAMUSCULAR; INTRAVENOUS ONCE
Status: CANCELLED | OUTPATIENT
Start: 2023-12-05 | End: 2023-12-05

## 2023-12-05 RX ORDER — TAMSULOSIN HYDROCHLORIDE 0.4 MG/1
0.4 CAPSULE ORAL DAILY
COMMUNITY
End: 2023-12-28 | Stop reason: SDUPTHER

## 2023-12-05 ASSESSMENT — ENCOUNTER SYMPTOMS
LOSS OF SENSATION IN FEET: 0
OCCASIONAL FEELINGS OF UNSTEADINESS: 1
DEPRESSION: 0

## 2023-12-05 ASSESSMENT — PATIENT HEALTH QUESTIONNAIRE - PHQ9
1. LITTLE INTEREST OR PLEASURE IN DOING THINGS: NOT AT ALL
SUM OF ALL RESPONSES TO PHQ9 QUESTIONS 1 AND 2: 0
2. FEELING DOWN, DEPRESSED OR HOPELESS: NOT AT ALL

## 2023-12-08 RX ORDER — KETOROLAC TROMETHAMINE 15 MG/ML
30 INJECTION, SOLUTION INTRAMUSCULAR; INTRAVENOUS ONCE
Status: SHIPPED | OUTPATIENT
Start: 2023-12-08 | End: 2023-12-13

## 2023-12-19 ENCOUNTER — PATIENT OUTREACH (OUTPATIENT)
Dept: PRIMARY CARE | Facility: CLINIC | Age: 88
End: 2023-12-19
Payer: MEDICARE

## 2023-12-19 DIAGNOSIS — I50.9 ACUTE DECOMPENSATED HEART FAILURE (MULTI): ICD-10-CM

## 2023-12-19 DIAGNOSIS — R55 NEAR SYNCOPE: ICD-10-CM

## 2023-12-19 DIAGNOSIS — J81.0 ACUTE PULMONARY EDEMA (MULTI): ICD-10-CM

## 2023-12-19 NOTE — PROGRESS NOTES
Discharge Facility:  [facility]     Discharge Diagnosis:  Near syncope   Acute decompensated heart failure   Acute pulmonary edema     Admission Date:12/14/23  Discharge Date: 12/16/23    PCP Appointment Date: Message sent to office to schedule sooner hospital visit and also encouraged on voicemail x 2 outreaches to call and schedule sooner appt  1/15/2024 4:15 PM (15 min)  [facility]    PRIMARY CARE ESTABLISHED   Authorization not required   [email or web address] ([place])   Moy Alford MD     Specialist Appointment Date:        2/28/2024 11:15 AM (15 min)  [facility]    CARDIOLOGY ESTABLISHED PATIENT   [id number] ([facility])   Susannah Dillon MD        3/26/2024 12:30 PM (30 min)  [facility]    CARDIOLOGY ESTABLISHED PATIENT   [id number] ([place])   PHANI [facility]     Hospital Encounter and Summary: Linked what is available at this time  I left voicemail to introduce myself and the TCM program to Oneil Nataliia. I encouraged patient /daughter to contact PCP for follow up appt. I gave my contact information to return my call so we can go over discharge instructions and see if I can assist in anyway.

## 2023-12-28 ENCOUNTER — OFFICE VISIT (OUTPATIENT)
Dept: PRIMARY CARE | Facility: CLINIC | Age: 88
End: 2023-12-28
Payer: MEDICARE

## 2023-12-28 VITALS
WEIGHT: 146.6 LBS | BODY MASS INDEX: 24.43 KG/M2 | SYSTOLIC BLOOD PRESSURE: 174 MMHG | OXYGEN SATURATION: 100 % | HEIGHT: 65 IN | TEMPERATURE: 97.3 F | HEART RATE: 70 BPM | DIASTOLIC BLOOD PRESSURE: 88 MMHG

## 2023-12-28 DIAGNOSIS — G47.00 INSOMNIA, UNSPECIFIED TYPE: ICD-10-CM

## 2023-12-28 DIAGNOSIS — N39.43 BENIGN PROSTATIC HYPERPLASIA WITH POST-VOID DRIBBLING: ICD-10-CM

## 2023-12-28 DIAGNOSIS — Z09 HOSPITAL DISCHARGE FOLLOW-UP: Primary | ICD-10-CM

## 2023-12-28 DIAGNOSIS — G40.909 SEIZURE DISORDER (MULTI): ICD-10-CM

## 2023-12-28 DIAGNOSIS — I48.0 PAROXYSMAL ATRIAL FIBRILLATION (MULTI): ICD-10-CM

## 2023-12-28 DIAGNOSIS — N40.1 BENIGN PROSTATIC HYPERPLASIA WITH POST-VOID DRIBBLING: ICD-10-CM

## 2023-12-28 DIAGNOSIS — F41.9 ANXIETY: ICD-10-CM

## 2023-12-28 DIAGNOSIS — F45.9 PSYCHOSOMATIC DISORDER: ICD-10-CM

## 2023-12-28 PROCEDURE — 1036F TOBACCO NON-USER: CPT | Performed by: INTERNAL MEDICINE

## 2023-12-28 PROCEDURE — 1159F MED LIST DOCD IN RCRD: CPT | Performed by: INTERNAL MEDICINE

## 2023-12-28 PROCEDURE — 99214 OFFICE O/P EST MOD 30 MIN: CPT | Performed by: INTERNAL MEDICINE

## 2023-12-28 PROCEDURE — 1157F ADVNC CARE PLAN IN RCRD: CPT | Performed by: INTERNAL MEDICINE

## 2023-12-28 PROCEDURE — 3079F DIAST BP 80-89 MM HG: CPT | Performed by: INTERNAL MEDICINE

## 2023-12-28 PROCEDURE — 3077F SYST BP >= 140 MM HG: CPT | Performed by: INTERNAL MEDICINE

## 2023-12-28 PROCEDURE — 1160F RVW MEDS BY RX/DR IN RCRD: CPT | Performed by: INTERNAL MEDICINE

## 2023-12-28 RX ORDER — MECLIZINE HYDROCHLORIDE 25 MG/1
1 TABLET ORAL 3 TIMES DAILY PRN
COMMUNITY
Start: 2023-12-20 | End: 2023-12-30

## 2023-12-28 RX ORDER — FUROSEMIDE 20 MG/1
1 TABLET ORAL EVERY OTHER DAY
COMMUNITY
Start: 2023-12-16 | End: 2024-02-21 | Stop reason: SDUPTHER

## 2023-12-28 ASSESSMENT — PATIENT HEALTH QUESTIONNAIRE - PHQ9
2. FEELING DOWN, DEPRESSED OR HOPELESS: NOT AT ALL
SUM OF ALL RESPONSES TO PHQ9 QUESTIONS 1 AND 2: 0
1. LITTLE INTEREST OR PLEASURE IN DOING THINGS: NOT AT ALL

## 2023-12-28 ASSESSMENT — ENCOUNTER SYMPTOMS
DEPRESSION: 0
LOSS OF SENSATION IN FEET: 1
OCCASIONAL FEELINGS OF UNSTEADINESS: 1

## 2023-12-30 PROBLEM — R07.9 CHEST PAIN: Status: RESOLVED | Noted: 2023-10-01 | Resolved: 2023-12-30

## 2023-12-30 PROBLEM — M25.611 STIFFNESS OF RIGHT SHOULDER JOINT: Status: RESOLVED | Noted: 2023-10-01 | Resolved: 2023-12-30

## 2023-12-30 PROBLEM — N28.9 RENAL INSUFFICIENCY: Status: RESOLVED | Noted: 2023-10-01 | Resolved: 2023-12-30

## 2023-12-30 PROBLEM — F41.8 ANXIETY WITH DEPRESSION: Status: RESOLVED | Noted: 2023-10-01 | Resolved: 2023-12-30

## 2023-12-30 PROBLEM — I47.20 PAROXYSMAL VENTRICULAR TACHYCARDIA: Status: RESOLVED | Noted: 2023-10-01 | Resolved: 2023-12-30

## 2023-12-30 PROBLEM — I50.20 SYSTOLIC CONGESTIVE HEART FAILURE, NYHA CLASS 2 (MULTI): Status: ACTIVE | Noted: 2018-12-07

## 2023-12-30 PROBLEM — R13.13 DYSPHAGIA, CRICOPHARYNGEAL: Status: RESOLVED | Noted: 2023-10-01 | Resolved: 2023-12-30

## 2023-12-30 PROBLEM — R51.9 HEADACHE: Status: RESOLVED | Noted: 2017-07-23 | Resolved: 2023-12-30

## 2023-12-30 PROBLEM — I10 ORTHOSTATIC HYPERTENSION: Status: RESOLVED | Noted: 2023-10-01 | Resolved: 2023-12-30

## 2023-12-30 PROBLEM — R27.0 ATAXIA: Status: RESOLVED | Noted: 2020-07-20 | Resolved: 2023-12-30

## 2023-12-30 PROBLEM — E87.5 HYPERKALEMIA: Status: RESOLVED | Noted: 2023-10-01 | Resolved: 2023-12-30

## 2023-12-30 PROBLEM — N28.9 KIDNEY DAMAGE: Status: RESOLVED | Noted: 2023-10-01 | Resolved: 2023-12-30

## 2023-12-30 PROBLEM — I50.1 CHRONIC LEFT-SIDED CHF (CONGESTIVE HEART FAILURE) (MULTI): Status: RESOLVED | Noted: 2023-10-01 | Resolved: 2023-12-30

## 2023-12-30 PROBLEM — R33.9 URINARY RETENTION: Status: RESOLVED | Noted: 2023-10-01 | Resolved: 2023-12-30

## 2023-12-30 PROBLEM — R09.89 LABILE HYPERTENSION: Status: RESOLVED | Noted: 2023-10-01 | Resolved: 2023-12-30

## 2023-12-30 PROBLEM — R03.0 ELEVATED BLOOD PRESSURE READING: Status: RESOLVED | Noted: 2023-10-01 | Resolved: 2023-12-30

## 2023-12-30 PROBLEM — R42 DIZZINESS: Status: RESOLVED | Noted: 2023-10-01 | Resolved: 2023-12-30

## 2023-12-30 PROBLEM — I95.9 HYPOTENSION (ARTERIAL): Status: RESOLVED | Noted: 2023-10-01 | Resolved: 2023-12-30

## 2023-12-30 PROBLEM — M75.00 FROZEN SHOULDER: Status: RESOLVED | Noted: 2023-10-01 | Resolved: 2023-12-30

## 2023-12-30 PROBLEM — Z09 HOSPITAL DISCHARGE FOLLOW-UP: Status: ACTIVE | Noted: 2023-12-30

## 2023-12-30 PROBLEM — K59.09 CHRONIC CONSTIPATION: Status: RESOLVED | Noted: 2023-10-01 | Resolved: 2023-12-30

## 2023-12-30 PROBLEM — R29.6 UNWITNESSED FALL: Status: RESOLVED | Noted: 2023-10-01 | Resolved: 2023-12-30

## 2023-12-30 PROBLEM — D64.9 ANEMIA: Status: RESOLVED | Noted: 2023-10-01 | Resolved: 2023-12-30

## 2023-12-30 PROBLEM — I47.29 PAROXYSMAL VENTRICULAR TACHYCARDIA (MULTI): Status: RESOLVED | Noted: 2023-10-01 | Resolved: 2023-12-30

## 2023-12-30 RX ORDER — TAMSULOSIN HYDROCHLORIDE 0.4 MG/1
0.4 CAPSULE ORAL DAILY
Qty: 90 CAPSULE | Refills: 1 | Status: SHIPPED | OUTPATIENT
Start: 2023-12-30 | End: 2024-03-26 | Stop reason: WASHOUT

## 2023-12-30 ASSESSMENT — ENCOUNTER SYMPTOMS
SORE THROAT: 0
DYSURIA: 0
LIGHT-HEADEDNESS: 0
ACTIVITY CHANGE: 0
COUGH: 0
MYALGIAS: 0
ABDOMINAL PAIN: 0

## 2023-12-31 ASSESSMENT — ENCOUNTER SYMPTOMS
ABDOMINAL PAIN: 0
ACTIVITY CHANGE: 0
COUGH: 0
LIGHT-HEADEDNESS: 0
DYSURIA: 0
MYALGIAS: 0
SORE THROAT: 0

## 2023-12-31 NOTE — PROGRESS NOTES
"CHIEF COMPLAINT:    Oneil William is a 89 y.o. male who presents for Hospital Follow-up (PATIENT HERE FOR [facility] ED FOLLOW-UP, DIZZINESS/BP.).    HISTORY OF PRESENT ILLNESS:  Oneil William  is a pleasant 89-year-old gentleman was sent ER last night.  He was having headache.  His blood pressure was high.  I have reviewed ER visit summary, ER physician's assessment, imaging studies, biochemical lab results and EKG.  I have also reviewed hospital course, consultants' notes, med reconciliation and discharge summary.  Even in the past patient had multiple ER visit with the similar symptoms.  He was given hydralazine.  His blood pressure is somewhat labile.  He is back him home.  He has been reassured.  He is doing well.  He does not have any acute medical complaint at this time.        Review of Systems   Constitutional:  Negative for activity change.   HENT:  Negative for congestion and sore throat.    Respiratory:  Negative for cough.    Cardiovascular:  Negative for chest pain.   Gastrointestinal:  Negative for abdominal pain.   Endocrine: Negative for polyuria.   Genitourinary:  Negative for dysuria.   Musculoskeletal:  Negative for myalgias.   Skin:  Negative for rash.   Neurological:  Negative for light-headedness.   Psychiatric/Behavioral:  Negative for behavioral problems.      Visit Vitals  /86 (BP Location: Left arm, Patient Position: Sitting, BP Cuff Size: Adult)   Pulse 70   Temp 36.7 °C (98.1 °F) (Temporal)   Ht 1.651 m (5' 5\")   Wt 67.1 kg (148 lb)   SpO2 98%   BMI 24.63 kg/m²   Smoking Status Never   BSA 1.75 m²         Wt Readings from Last 10 Encounters:   12/28/23 66.5 kg (146 lb 9.6 oz)   12/05/23 67.1 kg (148 lb)   11/24/23 68.5 kg (151 lb)   10/18/23 67 kg (147 lb 12.8 oz)   09/18/23 66.7 kg (147 lb)   08/23/23 67.1 kg (148 lb)   08/04/23 68.1 kg (150 lb 3.2 oz)   06/26/23 68.9 kg (152 lb)   06/06/23 68 kg (150 lb)   05/09/23 69.9 kg (154 lb)       Physical Exam  Vitals and nursing note reviewed. "   Constitutional:       Appearance: Normal appearance.   HENT:      Head: Normocephalic.      Right Ear: Tympanic membrane normal.      Left Ear: Tympanic membrane normal.      Nose: Nose normal.      Mouth/Throat:      Mouth: Mucous membranes are moist.   Cardiovascular:      Rate and Rhythm: Normal rate and regular rhythm.      Pulses: Normal pulses.      Heart sounds: No murmur heard.  Pulmonary:      Effort: No respiratory distress.      Breath sounds: Normal breath sounds.   Abdominal:      Palpations: Abdomen is soft.   Musculoskeletal:      Cervical back: Neck supple.      Right lower leg: No edema.      Left lower leg: No edema.   Skin:     General: Skin is warm.      Findings: No rash.   Neurological:      General: No focal deficit present.      Mental Status: He is alert and oriented to person, place, and time.   Psychiatric:         Mood and Affect: Mood normal.        RECENT LABS:  Lab Results   Component Value Date    WBC 10.9 09/26/2023    HGB 9.6 (L) 09/26/2023    HCT 29.0 (L) 09/26/2023     09/26/2023     09/26/2023    K 4.1 09/26/2023     09/26/2023    CREATININE 1.47 (H) 09/26/2023    BUN 24 (H) 09/26/2023    CO2 26 09/26/2023    INR 1.2 (H) 09/26/2023     IMAGING:  Reviewed:   MEDICATIONS:   Current Outpatient Medications   Medication Instructions    aspirin 81 mg EC tablet 1 tablet, oral, Daily    atorvastatin (Lipitor) 80 mg tablet 1 tablet, oral, Nightly    amilcar oil-levomenthol (FDgard) 25-20.75 mg capsule 1 capsule, oral, Every morning, may take additonal tablet in the evening if needed<BR>    carvedilol (Coreg) 6.25 mg tablet 1 tablet, oral, 2 times daily with meals    cholecalciferol (Vitamin D-3) 1,250 mcg (50,000 unit) capsule 1 capsule, oral, Weekly    digoxin (Lanoxin) 125 MCG tablet 1 tablet, oral, Daily    divalproex (DEPAKOTE) 500 mg, oral, 3 times daily, AFTER MEALS.    docusate sodium (COLACE) 100 mg, oral, Daily PRN    folic acid (Folvite) 1 mg tablet 1  tablet, oral, Daily    furosemide (Lasix) 20 mg tablet 1 tablet, oral, Every other day    hydrALAZINE (Apresoline) 50 mg tablet Take ( 1) 50 mg tablet twice day one in AM and one at bedtime,  Take (2) 50 mg tablets in afternoon to total 100 mg    mirtazapine (REMERON) 15 mg, oral, Nightly    multivit-min/ferrous fumarate (MULTI VITAMIN ORAL) 1 tablet, oral, Daily    nitroglycerin (NITROSTAT) 0.4 mg, sublingual, Every 5 min PRN, DO NOT EXCEED A TOTAL OF 3 DOSES IN 15 MINUTES. CALL 911 AFTER 3RD DOSE    ondansetron (ZOFRAN) 4 mg, oral, 3 times daily PRN    pantoprazole (ProtoNix) 40 mg EC tablet 1 tablet, oral, Daily    tamsulosin (FLOMAX) 0.4 mg, oral, Daily      TODAY'S VISIT  DX:   1. Hospital discharge follow-up        2. Acute intractable tension-type headache  ketorolac (Toradol) injection 30 mg      3. Primary hypertension        4. Moderate dementia without behavioral disturbance, psychotic disturbance, mood disturbance, or anxiety, unspecified dementia type (CMS/HCC)           MEDICAL DECISION MAKING:  - Recent lab work and relevant imaging studies have been reviewed.    - The current active medical co morbidities have been considered.   - Relevant correspondence/notes from specialty consultants were reviewed and discussed with patient.    - Patient was given treatment as per above plan.   - Patient will continue with current medical therapy and plan.   - Medication have been sent for refill.    - Next Follow up in 3 months.

## 2023-12-31 NOTE — PROGRESS NOTES
CHIEF COMPLAINT:    Oneil William is a 89 y.o. male who presents for Hospital Follow-up (Patient is in office today for a [facility] follow-up 12/16/2023. Near syncope, heart failure, Acute pulmonary oliva.) and Epistaxis (Nose Bleed) (Patient advises that he had a nose bleed that was pretty yesterday and wasn't feeling to well yesterday and was feeling dizzy. ).    HISTORY OF PRESENT ILLNESS:  Oneil William  is a pleasant 89-year-old gentleman comes with his daughter.  Patient had multiple ER visits.  He is an anxious person.  He has some psychosomatic disorder.  He does not sleep at night but he thinks that if he sleeps then he will have seizure.  Currently patient has seen a neurologist and on antiepileptic medications.  Every time he goes to the emergency department nothing is found that is obviously concerning.  He does not get his blood pressure under control because of the anxiety.  He keeps checking his blood pressure and found it to be very high and then he rushes to the emergency department.  Otherwise he has healthy.  He does not have any acute medical complaint.  He has been given mirtazapine for insomnia as well as anxiety but he has not been taking this medications.  Again this time I have reviewed ER visit summary, ER physician's assessment, imaging studies, biochemical lab results and EKG.  I have also reviewed hospital course, consultants' notes, med reconciliation and discharge summary.    Epistaxis (Nose Bleed)         Review of Systems   Constitutional:  Negative for activity change.   HENT:  Negative for congestion, nosebleeds and sore throat.    Respiratory:  Negative for cough.    Cardiovascular:  Negative for chest pain.   Gastrointestinal:  Negative for abdominal pain.   Endocrine: Negative for polyuria.   Genitourinary:  Negative for dysuria.   Musculoskeletal:  Negative for myalgias.   Skin:  Negative for rash.   Neurological:  Negative for light-headedness.   Psychiatric/Behavioral:  Negative for  "behavioral problems.      Visit Vitals  /88 (BP Location: Left arm, Patient Position: Sitting, BP Cuff Size: Adult)   Pulse 70   Temp 36.3 °C (97.3 °F) (Temporal)   Ht 1.651 m (5' 5\")   Wt 66.5 kg (146 lb 9.6 oz)   SpO2 100% Comment: RA   BMI 24.40 kg/m²   Smoking Status Never   BSA 1.75 m²         Wt Readings from Last 10 Encounters:   12/28/23 66.5 kg (146 lb 9.6 oz)   12/05/23 67.1 kg (148 lb)   11/24/23 68.5 kg (151 lb)   10/18/23 67 kg (147 lb 12.8 oz)   09/18/23 66.7 kg (147 lb)   08/23/23 67.1 kg (148 lb)   08/04/23 68.1 kg (150 lb 3.2 oz)   06/26/23 68.9 kg (152 lb)   06/06/23 68 kg (150 lb)   05/09/23 69.9 kg (154 lb)       Physical Exam  Vitals and nursing note reviewed.   Constitutional:       Appearance: Normal appearance.   HENT:      Head: Normocephalic.      Right Ear: Tympanic membrane normal.      Left Ear: Tympanic membrane normal.      Nose: Nose normal.      Mouth/Throat:      Mouth: Mucous membranes are moist.   Cardiovascular:      Rate and Rhythm: Normal rate and regular rhythm.      Pulses: Normal pulses.      Heart sounds: No murmur heard.  Pulmonary:      Effort: No respiratory distress.      Breath sounds: Normal breath sounds.   Abdominal:      Palpations: Abdomen is soft.   Musculoskeletal:      Cervical back: Neck supple.      Right lower leg: No edema.      Left lower leg: No edema.   Skin:     General: Skin is warm.      Findings: No rash.   Neurological:      General: No focal deficit present.      Mental Status: He is alert and oriented to person, place, and time.   Psychiatric:         Mood and Affect: Mood normal.        RECENT LABS:  Lab Results   Component Value Date    WBC 10.9 09/26/2023    HGB 9.6 (L) 09/26/2023    HCT 29.0 (L) 09/26/2023     09/26/2023     09/26/2023    K 4.1 09/26/2023     09/26/2023    CREATININE 1.47 (H) 09/26/2023    BUN 24 (H) 09/26/2023    CO2 26 09/26/2023    INR 1.2 (H) 09/26/2023     IMAGING:  Reviewed:   MEDICATIONS: "   Current Outpatient Medications   Medication Instructions    aspirin 81 mg EC tablet 1 tablet, oral, Daily    atorvastatin (Lipitor) 80 mg tablet 1 tablet, oral, Nightly    amilcar oil-levomenthol (FDgard) 25-20.75 mg capsule 1 capsule, oral, Every morning, may take additonal tablet in the evening if needed<BR>    carvedilol (Coreg) 6.25 mg tablet 1 tablet, oral, 2 times daily with meals    cholecalciferol (Vitamin D-3) 1,250 mcg (50,000 unit) capsule 1 capsule, oral, Weekly    digoxin (Lanoxin) 125 MCG tablet 1 tablet, oral, Daily    divalproex (DEPAKOTE) 500 mg, oral, 3 times daily, AFTER MEALS.    docusate sodium (COLACE) 100 mg, oral, Daily PRN    folic acid (Folvite) 1 mg tablet 1 tablet, oral, Daily    furosemide (Lasix) 20 mg tablet 1 tablet, oral, Every other day    hydrALAZINE (Apresoline) 50 mg tablet Take ( 1) 50 mg tablet twice day one in AM and one at bedtime,  Take (2) 50 mg tablets in afternoon to total 100 mg    meclizine (Antivert) 25 mg tablet 1 tablet, oral, 3 times daily PRN    mirtazapine (REMERON) 15 mg, oral, Nightly    multivit-min/ferrous fumarate (MULTI VITAMIN ORAL) 1 tablet, oral, Daily    nitroglycerin (NITROSTAT) 0.4 mg, sublingual, Every 5 min PRN, DO NOT EXCEED A TOTAL OF 3 DOSES IN 15 MINUTES. CALL 911 AFTER 3RD DOSE    ondansetron (ZOFRAN) 4 mg, oral, 3 times daily PRN    pantoprazole (ProtoNix) 40 mg EC tablet 1 tablet, oral, Daily    tamsulosin (FLOMAX) 0.4 mg, oral, Daily      TODAY'S VISIT  DX:   1. Paroxysmal atrial fibrillation (CMS/HCC)        2. Hospital discharge follow-up        3. Anxiety        4. Psychosomatic disorder        5. Seizure disorder (CMS/HCC)        6. Insomnia, unspecified type        7. Benign prostatic hyperplasia with post-void dribbling           MEDICAL DECISION MAKING:  - Recent lab work and relevant imaging studies have been reviewed.    - The current active medical co morbidities have been considered.   - Relevant correspondence/notes from  specialty consultants were reviewed and discussed with patient.    - Patient was given treatment as per above plan.   - Patient will continue with current medical therapy and plan.   - Medication have been sent for refill.    - Next Follow up in 3 months..

## 2024-01-08 ENCOUNTER — APPOINTMENT (OUTPATIENT)
Dept: PRIMARY CARE | Facility: CLINIC | Age: 89
End: 2024-01-08
Payer: MEDICARE

## 2024-01-10 ENCOUNTER — TELEPHONE (OUTPATIENT)
Dept: PRIMARY CARE | Facility: CLINIC | Age: 89
End: 2024-01-10
Payer: MEDICARE

## 2024-01-10 DIAGNOSIS — I25.5 ISCHEMIC CARDIOMYOPATHY: ICD-10-CM

## 2024-01-10 DIAGNOSIS — I48.0 PAROXYSMAL ATRIAL FIBRILLATION (MULTI): Primary | ICD-10-CM

## 2024-01-10 NOTE — TELEPHONE ENCOUNTER
Pamela from [facility] calling to state that patient first declined PT/OT, but now is willing, stated that patient will benefit from PT/OT, so requesting another referral be faxed to [facility] at [phone]

## 2024-01-15 ENCOUNTER — APPOINTMENT (OUTPATIENT)
Dept: PRIMARY CARE | Facility: CLINIC | Age: 89
End: 2024-01-15
Payer: MEDICARE

## 2024-01-15 DIAGNOSIS — I48.0 PAROXYSMAL ATRIAL FIBRILLATION (MULTI): ICD-10-CM

## 2024-01-15 RX ORDER — DIGOXIN 125 MCG
125 TABLET ORAL DAILY
Qty: 90 TABLET | Refills: 3 | Status: SHIPPED | OUTPATIENT
Start: 2024-01-15 | End: 2025-01-14

## 2024-01-19 ENCOUNTER — PATIENT OUTREACH (OUTPATIENT)
Dept: PRIMARY CARE | Facility: CLINIC | Age: 89
End: 2024-01-19
Payer: MEDICARE

## 2024-01-19 NOTE — PROGRESS NOTES
Call regarding appt. with PCP on 12/28/24 after hospitalization.  Unable to reach patient for call back after patient's follow up appointment with PCP.   LVM with call back number for patient to call if needed to assist with any questions or concerns patient may have.

## 2024-02-02 ENCOUNTER — PATIENT OUTREACH (OUTPATIENT)
Dept: PRIMARY CARE | Facility: CLINIC | Age: 89
End: 2024-02-02
Payer: MEDICARE

## 2024-02-13 ENCOUNTER — OFFICE VISIT (OUTPATIENT)
Dept: CARDIOLOGY | Facility: CLINIC | Age: 89
End: 2024-02-13
Payer: MEDICARE

## 2024-02-13 ENCOUNTER — APPOINTMENT (OUTPATIENT)
Dept: CARDIOLOGY | Facility: CLINIC | Age: 89
End: 2024-02-13
Payer: MEDICARE

## 2024-02-13 VITALS
HEART RATE: 71 BPM | SYSTOLIC BLOOD PRESSURE: 126 MMHG | WEIGHT: 146 LBS | DIASTOLIC BLOOD PRESSURE: 62 MMHG | BODY MASS INDEX: 24.3 KG/M2

## 2024-02-13 DIAGNOSIS — I10 PRIMARY HYPERTENSION: Primary | ICD-10-CM

## 2024-02-13 DIAGNOSIS — I25.5 ISCHEMIC CARDIOMYOPATHY: ICD-10-CM

## 2024-02-13 DIAGNOSIS — Z95.810 ICD (IMPLANTABLE CARDIOVERTER-DEFIBRILLATOR) IN PLACE: ICD-10-CM

## 2024-02-13 DIAGNOSIS — I50.22 CHRONIC SYSTOLIC CONGESTIVE HEART FAILURE, NYHA CLASS 2 (MULTI): ICD-10-CM

## 2024-02-13 DIAGNOSIS — N18.31 STAGE 3A CHRONIC KIDNEY DISEASE (MULTI): ICD-10-CM

## 2024-02-13 PROCEDURE — 1159F MED LIST DOCD IN RCRD: CPT | Performed by: NURSE PRACTITIONER

## 2024-02-13 PROCEDURE — 3074F SYST BP LT 130 MM HG: CPT | Performed by: NURSE PRACTITIONER

## 2024-02-13 PROCEDURE — 1036F TOBACCO NON-USER: CPT | Performed by: NURSE PRACTITIONER

## 2024-02-13 PROCEDURE — 3078F DIAST BP <80 MM HG: CPT | Performed by: NURSE PRACTITIONER

## 2024-02-13 PROCEDURE — 1157F ADVNC CARE PLAN IN RCRD: CPT | Performed by: NURSE PRACTITIONER

## 2024-02-13 PROCEDURE — 1160F RVW MEDS BY RX/DR IN RCRD: CPT | Performed by: NURSE PRACTITIONER

## 2024-02-13 PROCEDURE — 99213 OFFICE O/P EST LOW 20 MIN: CPT | Performed by: NURSE PRACTITIONER

## 2024-02-13 RX ORDER — MECLIZINE HYDROCHLORIDE 25 MG/1
25 TABLET ORAL 3 TIMES DAILY PRN
COMMUNITY
End: 2024-03-22 | Stop reason: SDUPTHER

## 2024-02-13 NOTE — PATIENT INSTRUCTIONS
Please try to increase your water intake, you should try to drink 6 - 8 glasses/bottles of water per day to stay well hydrated.      Please take your blood pressure 2 yours after you take your morning medications and write them down to bring with you to your next office visit.

## 2024-02-13 NOTE — PROGRESS NOTES
Oneil William is a 89 y.o. male that presents to the office today with his friend Zee for evaluation of blood pressure .  He follows with his  primary cardiologist Dr. Dillon and was added to my schedule today.   PMH as noted below.     Noted multiple recent ER visit for dizziness/vertigo and HTN.  He states that he recently had home health care until yesterday who recommended he come to the office to have his blood pressure evaluated.  He states that he has the same morning routine, he weighs himself, urinates, then takes his blood pressure, stating that his blood pressure is usually elevated in the morning.  He states that [facility] was coming to his home at different times of the day and did not consistently take his blood pressure.  He does admit to having periods of dizziness/vertigo but states that he does only takes his anivert once in a while for this.     He currently denies chest pain, chest pressure, chest tightness, shortness of breath, palpitations, lightheadedness or dizziness.       PMH  1. Congestive heart failure functional class II/III  2.. Cardiac catheterization 11/2018 LVEDP 10 mmHg LVEF 40% left main 0% stenosis LAD 20% within previous stent in the distal vessel, mid LAD stent 20% stenosis, left circumflex 30% stenosis RCA less than 20% proximal and distal stenosis.  3. Has tendency for hyperkalemia, have to watch closely. Hyperkalemia was the reason for discontinuation of Entresto  4. Chronic kidney disease stage III  5. Tendency for orthostatic hypotension. Not orthostatic today, blood pressure is at target, patient feels well.  6. Degenerative joint disease, ambulates with a wheeled walker.  7. Increased frequency of urination, partly related to BPH  8. Echocardiogram March 3, 2021 LVEF 40 to 45%, normal RV size and function, trivial mitral regurgitation 1+ tricuspid regurgitation RVSP 30 mmHg trivial tricuspid regurgitation  9. Biventricular ICD, device interrogation April 2021 showed less than 1%  atrial fibrillation burden longest duration 21 hours, this is addressed by EP service  10. Elevated D-dimer 0.7 6 June 2021-CT chest negative for pulmonary embolism  11. Status post injection right rotator cuff for pain.  12. Carotid ultrasound 2018 less than 50% bilateral carotid stenosis  12. Lexiscan Myoview March 2021 no ischemia LVEF 39%  13. chronically elevated troponin  14. seizure disorder   15. Orthostatic hypotension  16. ACE inhibitor/angiotensin receptor blocker/Entresto intolerance due to hyperkalemia  17. BPH  18. Frequent ER visits, for multitude of reasons, lately for accelerated hypertension, chronic dizziness, and now with complaints of chest pain and jaw discomfort. Did not try nitroglycerin but took Tylenol. Very difficult to assess clinically.  19. Agree with the fact that anxiety and/or depression are triggering several of the ER visits.  20. Lexiscan Myoview October 2022-no evidence of ischemia or infarction, LVEF 54%, LVEF was reported to be 39% in March 2021 transient ischemic dilatation 1.0 which is normal.    Testing Reviewed  2/3/2024 labs: , K3.6, BUN 28, creatinine 1.4, ALT 22, AST 74, TSH 2.7, WBC 7.2, Hgb 10, HCT 31, platelets 298, troponin 47, 50, 42.  (Chronically elevated ).      Assessment/Plan  Hypertension:  Well controlled in office today 126/62, reassessed by myself 122/62. He states that he took his morning medications this morning prior to office visit.  No adjustment of medications based on today's blood pressure.  I have recommended that he take his blood pressure on a daily basis 2 hours after his morning medications and document his blood pressure to bring with him to his next office visit.   Dizziness: I have recommended he take his Anivert as prescribed for history of Vertigo.   Follow in office with Dr. Dillon as scheduled or sooner if needed.       Patient Active Problem List   Diagnosis    DDD (degenerative disc disease), cervical    Anxiety    Atrial  fibrillation (CMS/HCC)    Benign enlargement of prostate    Bilateral carotid artery disease (CMS/HCC)    Cardiomyopathy (CMS/HCC)    Chronic kidney disease, stage 2 (mild)    Chronic right shoulder pain    GERD (gastroesophageal reflux disease)    ICD (implantable cardioverter-defibrillator) in place    Moderate dementia (CMS/HCC)    Pacemaker    Peripheral neuropathy    Seizure disorder (CMS/HCC)    Stage 3a chronic kidney disease (CMS/HCC)    Systolic congestive heart failure, NYHA class 2 (CMS/[facility])    Vitamin B12 deficiency    Vitamin D deficiency disease    At risk for fall due to comorbid condition    Body mass index (BMI) of 24.0 to 24.9 in adult    CAD (coronary artery disease)    Hypertension    Insomnia    Iron deficiency    Psychosomatic disorder    Cardiac dysrhythmia    Combined form of senile cataract    Epiretinal membrane    Hospital discharge follow-up       Social History     Tobacco Use    Smoking status: Never    Smokeless tobacco: Never   Vaping Use    Vaping Use: Never used   Substance Use Topics    Alcohol use: Not Currently    Drug use: Not Currently       Past Medical History:   Diagnosis Date    Encounter for follow-up examination after completed treatment for conditions other than malignant neoplasm 07/11/2022    Hospital discharge follow-up    Encounter for follow-up examination after completed treatment for conditions other than malignant neoplasm 05/04/2022    Hospital discharge follow-up    Hypertension     ICD (implantable cardioverter-defibrillator) in place     Insomnia disorder     Personal history of other diseases of the circulatory system 12/07/2022    History of hypertension    Personal history of other endocrine, nutritional and metabolic disease 12/07/2022    History of hyperlipidemia         Current Outpatient Medications:     aspirin 81 mg EC tablet, Take 1 tablet (81 mg) by mouth once daily., Disp: , Rfl:     atorvastatin (Lipitor) 80 mg tablet, Take 1 tablet (80 mg) by  mouth once daily at bedtime., Disp: , Rfl:     amilcar oil-levomenthol (FDgard) 25-20.75 mg capsule, Take 1 capsule by mouth once daily in the morning. may take additonal tablet in the evening if needed, Disp: , Rfl:     carvedilol (Coreg) 6.25 mg tablet, Take 1 tablet (6.25 mg) by mouth 2 times a day with meals., Disp: , Rfl:     cholecalciferol (Vitamin D-3) 1,250 mcg (50,000 unit) capsule, Take 1 capsule (50,000 Units) by mouth 1 (one) time per week., Disp: , Rfl:     digoxin (Lanoxin) 125 MCG tablet, Take 1 tablet (125 mcg) by mouth once daily., Disp: 90 tablet, Rfl: 3    divalproex (Depakote) 500 mg EC tablet, Take 1 tablet (500 mg) by mouth 3 times a day. AFTER MEALS., Disp: , Rfl:     docusate sodium (Colace) 100 mg capsule, Take 1 capsule (100 mg) by mouth once daily as needed., Disp: , Rfl:     folic acid (Folvite) 1 mg tablet, Take 1 tablet (1 mg) by mouth once daily., Disp: , Rfl:     furosemide (Lasix) 20 mg tablet, Take 1 tablet (20 mg) by mouth every other day., Disp: , Rfl:     hydrALAZINE (Apresoline) 50 mg tablet, Take ( 1) 50 mg tablet twice day one in AM and one at bedtime,  Take (2) 50 mg tablets in afternoon to total 100 mg, Disp: 30 tablet, Rfl: 6    meclizine (Antivert) 25 mg tablet, Take 1 tablet (25 mg) by mouth 3 times a day as needed for dizziness., Disp: , Rfl:     mirtazapine (Remeron) 15 mg tablet, Take 1 tablet (15 mg) by mouth once daily at bedtime., Disp: , Rfl:     multivit-min/ferrous fumarate (MULTI VITAMIN ORAL), Take 1 tablet by mouth once daily., Disp: , Rfl:     nitroglycerin (Nitrostat) 0.4 mg SL tablet, Place 1 tablet (0.4 mg) under the tongue every 5 minutes if needed for chest pain. DO NOT EXCEED A TOTAL OF 3 DOSES IN 15 MINUTES. CALL 911 AFTER 3RD DOSE, Disp: , Rfl:     ondansetron (Zofran) 4 mg tablet, Take 1 tablet (4 mg) by mouth 3 times a day as needed for nausea., Disp: , Rfl:     pantoprazole (ProtoNix) 40 mg EC tablet, Take 1 tablet (40 mg) by mouth once daily.,  Disp: , Rfl:     tamsulosin (Flomax) 0.4 mg 24 hr capsule, Take 1 capsule (0.4 mg) by mouth once daily., Disp: 90 capsule, Rfl: 1    Codeine, Lisinopril, and Penicillins    Family History   Problem Relation Name Age of Onset    Cancer Father      Other (cardiac disorder) Sister         Past Surgical History:   Procedure Laterality Date    CT ANGIO NECK  3/10/2023    CT NECK ANGIO W AND WO IV CONTRAST 3/10/2023    CT HEAD ANGIO W AND WO IV CONTRAST  3/10/2023    CT HEAD ANGIO W AND WO IV CONTRAST 3/10/2023    OTHER SURGICAL HISTORY  11/10/2021    Surgery    OTHER SURGICAL HISTORY  11/10/2021    Cataract surgery    OTHER SURGICAL HISTORY  11/10/2021    Inguinal hernia repair    OTHER SURGICAL HISTORY  11/10/2021    Pacemaker insertion    OTHER SURGICAL HISTORY  11/10/2021    Esophagogastroduodenoscopy    OTHER SURGICAL HISTORY  05/04/2022    Prostate surgery    OTHER SURGICAL HISTORY  08/30/2022    Pancreatectomy    OTHER SURGICAL HISTORY  08/30/2022    Prostatectomy    OTHER SURGICAL HISTORY  03/29/2022    Pancreatic surgery    OTHER SURGICAL HISTORY  03/03/2022    Appendectomy          Review of systems  Constitutional: No weight loss, fever, chills, weakness or fatigue  HEENT: No visual loss, blurred vision, double vision or yellow sclerae  Skin: No rash or itching  Cardiovascular: No chest pain, pressure or discomfort, No palpitations or edema.  Respiratory: No shortness of breath, cough or sputum  Gastrointestinal: No nausea, vomiting or diarrhea. No bloody or dark tarry stools.  Neurological: No headache, lightheadedness, dizziness, syncope.   Musculoskeletal: No muscle, back pain, joint pain or stiffness.  Hematologic: No anemia, bleeding or bruising.    /62 (BP Location: Right arm, Patient Position: Sitting)   Pulse 71   Wt 66.2 kg (146 lb)   BMI 24.30 kg/m²     Patient Active Problem List   Diagnosis    DDD (degenerative disc disease), cervical    Anxiety    Atrial fibrillation (CMS/HCC)    Benign  enlargement of prostate    Bilateral carotid artery disease (CMS/HCC)    Cardiomyopathy (CMS/HCC)    Chronic kidney disease, stage 2 (mild)    Chronic right shoulder pain    GERD (gastroesophageal reflux disease)    ICD (implantable cardioverter-defibrillator) in place    Moderate dementia (CMS/HCC)    Pacemaker    Peripheral neuropathy    Seizure disorder (CMS/HCC)    Stage 3a chronic kidney disease (CMS/HCC)    Systolic congestive heart failure, NYHA class 2 (CMS/HCC)    Vitamin B12 deficiency    Vitamin D deficiency disease    At risk for fall due to comorbid condition    Body mass index (BMI) of 24.0 to 24.9 in adult    CAD (coronary artery disease)    Hypertension    Insomnia    Iron deficiency    Psychosomatic disorder    Cardiac dysrhythmia    Combined form of senile cataract    Epiretinal membrane    Hospital discharge follow-up         Physical Exam  Constitutional: Well developed, awake/alert x 3, no distress.  Respiratory/Thorax: patent airways, CTAB, normal breath sounds with good expansion.  Cardiovascular: Regular rate and rhythm, no murmurs, normal S1 and S2,   Gastrointestinal: Non distended, soft, non-tender, no rebound tenderness or guarding.  Extremities: No cyanosis, edema.     Neurological: Alert and oriented x 3. Moves extremities spontaneous with purpose.  Psychological: Appropriate mood and behavior  Skin: Warm and Dry. No lesions or rashes.         Please excuse any errors in grammar or translation related to dictation, voice recognition software was used to prepare this document.

## 2024-02-21 ENCOUNTER — OFFICE VISIT (OUTPATIENT)
Dept: CARDIOLOGY | Facility: CLINIC | Age: 89
End: 2024-02-21
Payer: MEDICARE

## 2024-02-21 VITALS
DIASTOLIC BLOOD PRESSURE: 78 MMHG | SYSTOLIC BLOOD PRESSURE: 138 MMHG | WEIGHT: 151 LBS | BODY MASS INDEX: 25.13 KG/M2 | HEART RATE: 71 BPM

## 2024-02-21 DIAGNOSIS — N18.31 STAGE 3A CHRONIC KIDNEY DISEASE (MULTI): ICD-10-CM

## 2024-02-21 DIAGNOSIS — I25.5 ISCHEMIC CARDIOMYOPATHY: ICD-10-CM

## 2024-02-21 DIAGNOSIS — I10 ORTHOSTATIC HYPERTENSION: ICD-10-CM

## 2024-02-21 DIAGNOSIS — I10 PRIMARY HYPERTENSION: ICD-10-CM

## 2024-02-21 DIAGNOSIS — I35.8 AORTIC SYSTOLIC MURMUR ON EXAMINATION: ICD-10-CM

## 2024-02-21 DIAGNOSIS — Z09 HOSPITAL DISCHARGE FOLLOW-UP: Primary | ICD-10-CM

## 2024-02-21 DIAGNOSIS — Z95.810 ICD (IMPLANTABLE CARDIOVERTER-DEFIBRILLATOR) IN PLACE: ICD-10-CM

## 2024-02-21 DIAGNOSIS — I50.22 CHRONIC SYSTOLIC CONGESTIVE HEART FAILURE, NYHA CLASS 2 (MULTI): ICD-10-CM

## 2024-02-21 DIAGNOSIS — R09.89 LABILE HYPERTENSION: ICD-10-CM

## 2024-02-21 PROBLEM — I12.9 BENIGN HYPERTENSIVE KIDNEY DISEASE WITH CHRONIC KIDNEY DISEASE STAGE I THROUGH STAGE IV, OR UNSPECIFIED(403.10): Status: ACTIVE | Noted: 2024-02-21

## 2024-02-21 PROCEDURE — 1159F MED LIST DOCD IN RCRD: CPT | Performed by: INTERNAL MEDICINE

## 2024-02-21 PROCEDURE — 1157F ADVNC CARE PLAN IN RCRD: CPT | Performed by: INTERNAL MEDICINE

## 2024-02-21 PROCEDURE — 1036F TOBACCO NON-USER: CPT | Performed by: INTERNAL MEDICINE

## 2024-02-21 PROCEDURE — 1160F RVW MEDS BY RX/DR IN RCRD: CPT | Performed by: INTERNAL MEDICINE

## 2024-02-21 PROCEDURE — 3075F SYST BP GE 130 - 139MM HG: CPT | Performed by: INTERNAL MEDICINE

## 2024-02-21 PROCEDURE — 3078F DIAST BP <80 MM HG: CPT | Performed by: INTERNAL MEDICINE

## 2024-02-21 PROCEDURE — 99214 OFFICE O/P EST MOD 30 MIN: CPT | Performed by: INTERNAL MEDICINE

## 2024-02-21 RX ORDER — NITROGLYCERIN 0.4 MG/1
0.4 TABLET SUBLINGUAL EVERY 5 MIN PRN
Qty: 90 TABLET | Refills: 3 | Status: SHIPPED | OUTPATIENT
Start: 2024-02-21 | End: 2025-02-20

## 2024-02-21 RX ORDER — HYDRALAZINE HYDROCHLORIDE 50 MG/1
TABLET, FILM COATED ORAL
Qty: 270 TABLET | Refills: 3 | Status: SHIPPED | OUTPATIENT
Start: 2024-02-21

## 2024-02-21 RX ORDER — ASPIRIN 81 MG/1
81 TABLET ORAL DAILY
Qty: 90 TABLET | Refills: 3 | Status: SHIPPED | OUTPATIENT
Start: 2024-02-21 | End: 2025-02-20

## 2024-02-21 RX ORDER — CARVEDILOL 6.25 MG/1
6.25 TABLET ORAL
Qty: 180 TABLET | Refills: 3 | Status: SHIPPED | OUTPATIENT
Start: 2024-02-21 | End: 2024-05-08 | Stop reason: DRUGHIGH

## 2024-02-21 RX ORDER — FUROSEMIDE 20 MG/1
20 TABLET ORAL EVERY OTHER DAY
Qty: 45 TABLET | Refills: 3 | Status: SHIPPED | OUTPATIENT
Start: 2024-02-21 | End: 2025-02-20

## 2024-02-21 RX ORDER — ATORVASTATIN CALCIUM 80 MG/1
80 TABLET, FILM COATED ORAL NIGHTLY
Qty: 90 TABLET | Refills: 3 | Status: SHIPPED | OUTPATIENT
Start: 2024-02-21 | End: 2025-02-20

## 2024-02-21 NOTE — PROGRESS NOTES
Most recently seen by Lizzie Ortiz 2/13/2024.  This patient has had multiple ER visits for various symptoms, most commonly dizzy spells.  Today he is accompanied by his lady friend Zee Condon, patient seems very happy, and says that he feels good.  He did not have any complaints either cardiac or otherwise today.  Subjective :     Was seen in the emergency department [facility] 2/16/2024 for tingling and numbness of his entire left arm.  I have reviewed notes.  No focal neurological deficit was identified.  Blood pressure was 137/59 pulse of 70.  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 Congestive heart failure functional class II/III  2.. Cardiac catheterization 11/2018 LVEDP 10 mmHg LVEF 40% left main 0% stenosis LAD 20% within previous stent in the distal vessel, mid LAD stent 20% stenosis, left circumflex 30% stenosis RCA less than 20% proximal and distal stenosis.  3. Has tendency for hyperkalemia, have to watch closely. Hyperkalemia was the reason for discontinuation of Entresto  4. Chronic kidney disease stage III  5. Tendency for orthostatic hypotension. Not orthostatic today, blood pressure is at target, patient feels well.  6. Degenerative joint disease, ambulates with a wheeled walker.  7. Increased frequency of urination, partly related to BPH  8. Echocardiogram March 3, 2021 LVEF 40 to 45%, normal RV size and function, trivial mitral regurgitation 1+ tricuspid regurgitation RVSP 30 mmHg trivial tricuspid regurgitation  9. Biventricular ICD, device interrogation April 2021 showed less than 1% atrial fibrillation burden longest duration 21 hours, this is addressed by EP service  10. Elevated D-dimer 0.7 6 June  2021-CT chest negative for pulmonary embolism  11. Status post injection right rotator cuff for pain.  12. Carotid ultrasound 2018 less than 50% bilateral carotid stenosis  12. Lexiscan Myoview March 2021 no ischemia LVEF 39%  13. chronically elevated troponin  14. seizure disorder   15. Orthostatic hypotension  16. ACE inhibitor/angiotensin receptor blocker/Entresto intolerance due to hyperkalemia  17. BPH  18. Frequent ER visits, for multitude of reasons, lately for accelerated hypertension, chronic dizziness, and now with complaints of chest pain and jaw discomfort. Did not try nitroglycerin but took Tylenol. Very difficult to assess clinically.  19. Agree with the fact that anxiety and/or depression are triggering several of the ER visits.  20. Lexiscan Myoview October 2022-no evidence of ischemia or infarction, LVEF 54%, LVEF was reported to be 39% in March 2021 transient ischemic dilatation 1.0 which is normal.   21.  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  CKD stage IIIb  23.  Echocardiogram March 2021-LVEF 40 to 45%, 1+ tricuspid regurgitation, RVSP 30 mmHg, trivial pulmonary regurgitation, minimal aortic leaflet calcification no evidence of aortic regurgitation peak and mean gradients 8 and 5 respectively  24.  Echocardiogram 12/15/2023-Limited study calcified aortic valve cusps stenotic cyst not assessed due to limited exam, LVEF 60 to 65% reduced RV systolic function mitral annular calcification, mildly dilated left atrium is a limited echo and so aortic valve was not assessed.  Evidence of diastolic dysfunction.    Objective   Failed to redirect to the Timeline version of the [employer] SmartLink.   Wt Readings from Last 3 Encounters:   02/21/24 68.5 kg (151 lb)   02/13/24 66.2 kg (146 lb)   12/28/23 66.5 kg (146 lb 9.6 oz)         Visit Vitals  /78 (BP Location: Right arm, Patient Position: Sitting)   Pulse 71   Wt 68.5 kg (151 lb)   BMI 25.13 kg/m²   Smoking Status Never   BSA 1.77 m²            Physical Exam:    Awake alert oriented x 3 lungs clear heart sounds regular with grade 2/6 to 3/6 crescendo decrescendo murmur along the left sternal border extremities show no edema  Meds:  Current Outpatient Medications   Medication Instructions    aspirin 81 mg, oral, Daily    atorvastatin (LIPITOR) 80 mg, oral, Nightly    amilcar oil-levomenthol (FDgard) 25-20.75 mg capsule 1 capsule, oral, Every morning, may take additonal tablet in the evening if needed<BR>    carvedilol (COREG) 6.25 mg, oral, 2 times daily with meals    cholecalciferol (Vitamin D-3) 1,250 mcg (50,000 unit) capsule 1 capsule, oral, Weekly    digoxin (LANOXIN) 125 mcg, oral, Daily    divalproex (DEPAKOTE) 500 mg, oral, 3 times daily, AFTER MEALS.    docusate sodium (COLACE) 100 mg, oral, Daily PRN    folic acid (Folvite) 1 mg tablet 1 tablet, oral, Daily    furosemide (LASIX) 20 mg, oral, Every other day    hydrALAZINE (Apresoline) 50 mg tablet Take ( 1) 50 mg tablet twice day one in AM and one at bedtime,  Take (2) 50 mg tablets in afternoon to total 100 mg    meclizine (ANTIVERT) 25 mg, oral, 3 times daily PRN    mirtazapine (REMERON) 15 mg, oral, Nightly    multivit-min/ferrous fumarate (MULTI VITAMIN ORAL) 1 tablet, oral, Daily    nitroglycerin (NITROSTAT) 0.4 mg, sublingual, Every 5 min PRN    ondansetron (ZOFRAN) 4 mg, oral, 3 times daily PRN    pantoprazole (ProtoNix) 40 mg EC tablet 1 tablet, oral, Daily    tamsulosin (FLOMAX) 0.4 mg, oral, Daily          Allergies   Allergen Reactions    Codeine Unknown    Lisinopril Other and Unknown     Hyperkalemia    Penicillins Unknown             LABS:    Lab Results   Component Value Date    WBC 10.9 09/26/2023    HGB 9.6 (L) 09/26/2023    HCT 29.0 (L) 09/26/2023     09/26/2023     09/26/2023    K 4.1  09/26/2023     09/26/2023    CREATININE 1.47 (H) 09/26/2023    BUN 24 (H) 09/26/2023    CO2 26 09/26/2023    INR 1.2 (H) 09/26/2023                       Patient Active Problem List    Diagnosis Date Noted    Benign hypertensive kidney disease with chronic kidney disease stage I through stage IV, or unspecified(403.10) 02/21/2024    Aortic systolic murmur on examination 02/21/2024    Hospital discharge follow-up 12/30/2023    DDD (degenerative disc disease), cervical 10/01/2023    Anxiety 10/01/2023    Atrial fibrillation (CMS/[facility]) 10/01/2023    Benign enlargement of prostate 10/01/2023    Bilateral carotid artery disease (CMS/[facility]) 10/01/2023    Cardiomyopathy (CMS/[facility]) 10/01/2023    Chronic kidney disease, stage 2 (mild) 10/01/2023    Chronic right shoulder pain 10/01/2023    GERD (gastroesophageal reflux disease) 10/01/2023    ICD (implantable cardioverter-defibrillator) in place 10/01/2023    Moderate dementia (CMS/[facility]) 10/01/2023    Pacemaker 10/01/2023    Peripheral neuropathy 10/01/2023    Seizure disorder (CMS/[facility]) 10/01/2023    Stage 3a chronic kidney disease (CMS/[facility]) 10/01/2023    Vitamin B12 deficiency 10/01/2023    Vitamin D deficiency disease 10/01/2023    At risk for fall due to comorbid condition 10/01/2023    Body mass index (BMI) of 24.0 to 24.9 in adult 10/01/2023    CAD (coronary artery disease) 10/01/2023    Hypertension 10/01/2023    Insomnia 10/01/2023    Iron deficiency 10/01/2023    Psychosomatic disorder 10/01/2023    Systolic congestive heart failure, NYHA class 2 (CMS/[facility]) 12/07/2018    Cardiac dysrhythmia 12/07/2018    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 Hospital discharge follow-up        2. Labile hypertension  Follow Up In Cardiology    Follow Up In Cardiology    aspirin 81 mg EC tablet    atorvastatin (Lipitor) 80 mg tablet    carvedilol (Coreg) 6.25 mg tablet    furosemide (Lasix) 20 mg tablet     nitroglycerin (Nitrostat) 0.4 mg SL tablet    hydrALAZINE (Apresoline) 50 mg tablet      3. ICD (implantable cardioverter-defibrillator) in place  Follow Up In Cardiology    Follow Up In Cardiology    Follow Up In Cardiology    aspirin 81 mg EC tablet    atorvastatin (Lipitor) 80 mg tablet    carvedilol (Coreg) 6.25 mg tablet    furosemide (Lasix) 20 mg tablet    nitroglycerin (Nitrostat) 0.4 mg SL tablet    hydrALAZINE (Apresoline) 50 mg tablet      4. Orthostatic hypertension  Follow Up In Cardiology    Follow Up In Cardiology    aspirin 81 mg EC tablet    atorvastatin (Lipitor) 80 mg tablet    carvedilol (Coreg) 6.25 mg tablet    furosemide (Lasix) 20 mg tablet    nitroglycerin (Nitrostat) 0.4 mg SL tablet    hydrALAZINE (Apresoline) 50 mg tablet      5. Primary hypertension  Follow Up In Cardiology    Follow Up In Cardiology    aspirin 81 mg EC tablet    atorvastatin (Lipitor) 80 mg tablet    carvedilol (Coreg) 6.25 mg tablet    furosemide (Lasix) 20 mg tablet    nitroglycerin (Nitrostat) 0.4 mg SL tablet    hydrALAZINE (Apresoline) 50 mg tablet      6. Chronic systolic congestive heart failure, NYHA class 2 (CMS/HCC)  Follow Up In Cardiology    Follow Up In Cardiology    aspirin 81 mg EC tablet    atorvastatin (Lipitor) 80 mg tablet    carvedilol (Coreg) 6.25 mg tablet    furosemide (Lasix) 20 mg tablet    nitroglycerin (Nitrostat) 0.4 mg SL tablet    hydrALAZINE (Apresoline) 50 mg tablet      7. Ischemic cardiomyopathy  Follow Up In Cardiology    Follow Up In Cardiology    aspirin 81 mg EC tablet    atorvastatin (Lipitor) 80 mg tablet    carvedilol (Coreg) 6.25 mg tablet    furosemide (Lasix) 20 mg tablet    nitroglycerin (Nitrostat) 0.4 mg SL tablet    hydrALAZINE (Apresoline) 50 mg tablet      8. Stage 3a chronic kidney disease (CMS/HCC)  Follow Up In Cardiology    Follow Up In Cardiology    aspirin 81 mg EC tablet    atorvastatin (Lipitor) 80 mg tablet    carvedilol (Coreg) 6.25 mg tablet    furosemide  (Lasix) 20 mg tablet    nitroglycerin (Nitrostat) 0.4 mg SL tablet    hydrALAZINE (Apresoline) 50 mg tablet      9. Aortic systolic murmur on examination           Patient has a very prominent systolic murmur.  He has labile hypertension orthostatic hypotension hypertensive kidney disease stage IIIb tendency for hyperkalemia, hence not a candidate for angiotensin receptor blockers or Entresto.  Previously we have tried these agents but had to stop them because of hyperkalemia.  Having said all this, for 89+ years he is doing phenomenally well.    Fall precautions were reiterated.  Fell 8 weeks ago and injured her left ankle.  Mechanical fall.  Follow up : 6 months  Echocardiogram prior to next visit to assess aortic valve and aortic stenosis      Provider Attestation - Scribe documentation    All medical record entries made by the Scribe were at my direction and personally dictated by me. I have reviewed the chart and agree that the record accurately reflects my personal performance of the history, physical exam, discussion and plan.

## 2024-02-22 ENCOUNTER — TELEPHONE (OUTPATIENT)
Dept: CARDIOLOGY | Facility: CLINIC | Age: 89
End: 2024-02-22
Payer: MEDICARE

## 2024-02-22 DIAGNOSIS — I35.8 AORTIC SYSTOLIC MURMUR ON EXAMINATION: Primary | ICD-10-CM

## 2024-02-22 DIAGNOSIS — I35.8 AORTIC SYSTOLIC MURMUR ON EXAMINATION: ICD-10-CM

## 2024-02-22 DIAGNOSIS — R42 DIZZINESS: ICD-10-CM

## 2024-02-22 NOTE — TELEPHONE ENCOUNTER
----- Message from Susannah Dillon MD sent at 2/21/2024  8:21 PM EST -----  Please order a echocardiogram on this patient.  Diagnosis systolic murmur of aorta and dizziness thank you to be done in the near future

## 2024-03-15 ENCOUNTER — PATIENT OUTREACH (OUTPATIENT)
Dept: PRIMARY CARE | Facility: CLINIC | Age: 89
End: 2024-03-15
Payer: MEDICARE

## 2024-03-15 NOTE — PROGRESS NOTES
12/12/19 0800   C-SSRS (Frequent Screen)   2. Have you actually had any thoughts of killing yourself? No   Suicide Evaluation Negative screen= no ideation, behaviors or history   Ephraim Allan LPC  12/12/2019     CM left voicemail for pt on follow up call to assess any final questions or concerns regarding hospitalization.

## 2024-03-19 DIAGNOSIS — K21.9 GASTROESOPHAGEAL REFLUX DISEASE, UNSPECIFIED WHETHER ESOPHAGITIS PRESENT: ICD-10-CM

## 2024-03-19 NOTE — TELEPHONE ENCOUNTER
12/28/2023    [id number] - [place], [place] - [address]  [address] [place] [address]  Phone: [phone] Fax: [phone]    [facility] office visit 6/5/2024

## 2024-03-21 ENCOUNTER — ANCILLARY PROCEDURE (OUTPATIENT)
Dept: CARDIOLOGY | Facility: CLINIC | Age: 89
End: 2024-03-21
Payer: MEDICARE

## 2024-03-21 DIAGNOSIS — I35.8 AORTIC SYSTOLIC MURMUR ON EXAMINATION: ICD-10-CM

## 2024-03-21 DIAGNOSIS — R42 DIZZINESS: ICD-10-CM

## 2024-03-21 PROCEDURE — 93306 TTE W/DOPPLER COMPLETE: CPT | Performed by: INTERNAL MEDICINE

## 2024-03-21 PROCEDURE — 93306 TTE W/DOPPLER COMPLETE: CPT

## 2024-03-22 DIAGNOSIS — K21.9 GASTROESOPHAGEAL REFLUX DISEASE, UNSPECIFIED WHETHER ESOPHAGITIS PRESENT: ICD-10-CM

## 2024-03-22 DIAGNOSIS — G40.909 SEIZURE DISORDER (MULTI): ICD-10-CM

## 2024-03-22 LAB
AORTIC VALVE MEAN GRADIENT: 13 MMHG
AORTIC VALVE PEAK VELOCITY: 2.41 M/S
AV PEAK GRADIENT: 23.2 MMHG
AVA (PEAK VEL): 1.9 CM2
AVA (VTI): 1.88 CM2
EJECTION FRACTION APICAL 4 CHAMBER: 58.2
EJECTION FRACTION: 61 %
LEFT VENTRICLE INTERNAL DIMENSION DIASTOLE: 4.29 CM (ref 3.5–6)
LEFT VENTRICULAR OUTFLOW TRACT DIAMETER: 2.3 CM
MITRAL VALVE E/A RATIO: 0.71
MITRAL VALVE E/E' RATIO: 25.12
RIGHT VENTRICLE PEAK SYSTOLIC PRESSURE: 29.4 MMHG

## 2024-03-22 RX ORDER — PANTOPRAZOLE SODIUM 40 MG/1
40 TABLET, DELAYED RELEASE ORAL DAILY
Qty: 90 TABLET | Refills: 1 | Status: SHIPPED | OUTPATIENT
Start: 2024-03-22

## 2024-03-22 RX ORDER — PANTOPRAZOLE SODIUM 40 MG/1
40 TABLET, DELAYED RELEASE ORAL DAILY
Qty: 90 TABLET | Refills: 1 | Status: SHIPPED | OUTPATIENT
Start: 2024-03-22 | End: 2024-03-22 | Stop reason: SDUPTHER

## 2024-03-22 RX ORDER — MECLIZINE HYDROCHLORIDE 25 MG/1
25 TABLET ORAL 3 TIMES DAILY PRN
Qty: 30 TABLET | Refills: 0 | Status: SHIPPED | OUTPATIENT
Start: 2024-03-22

## 2024-03-26 ENCOUNTER — APPOINTMENT (OUTPATIENT)
Dept: CARDIOLOGY | Facility: HOSPITAL | Age: 89
End: 2024-03-26
Payer: MEDICARE

## 2024-03-26 ENCOUNTER — APPOINTMENT (OUTPATIENT)
Dept: RADIOLOGY | Facility: HOSPITAL | Age: 89
End: 2024-03-26
Payer: MEDICARE

## 2024-03-26 ENCOUNTER — HOSPITAL ENCOUNTER (EMERGENCY)
Facility: HOSPITAL | Age: 89
Discharge: HOME | End: 2024-03-26
Attending: STUDENT IN AN ORGANIZED HEALTH CARE EDUCATION/TRAINING PROGRAM
Payer: MEDICARE

## 2024-03-26 ENCOUNTER — OFFICE VISIT (OUTPATIENT)
Dept: CARDIOLOGY | Facility: CLINIC | Age: 89
End: 2024-03-26
Payer: MEDICARE

## 2024-03-26 VITALS
HEIGHT: 65 IN | WEIGHT: 155 LBS | DIASTOLIC BLOOD PRESSURE: 64 MMHG | BODY MASS INDEX: 25.83 KG/M2 | HEART RATE: 73 BPM | SYSTOLIC BLOOD PRESSURE: 128 MMHG

## 2024-03-26 VITALS
DIASTOLIC BLOOD PRESSURE: 66 MMHG | SYSTOLIC BLOOD PRESSURE: 139 MMHG | OXYGEN SATURATION: 97 % | HEIGHT: 65 IN | TEMPERATURE: 98.1 F | HEART RATE: 69 BPM | WEIGHT: 156 LBS | BODY MASS INDEX: 25.99 KG/M2 | RESPIRATION RATE: 17 BRPM

## 2024-03-26 DIAGNOSIS — I48.0 PAROXYSMAL ATRIAL FIBRILLATION (MULTI): Primary | ICD-10-CM

## 2024-03-26 DIAGNOSIS — I10 PRIMARY HYPERTENSION: ICD-10-CM

## 2024-03-26 DIAGNOSIS — Z95.810 ICD (IMPLANTABLE CARDIOVERTER-DEFIBRILLATOR) IN PLACE: ICD-10-CM

## 2024-03-26 DIAGNOSIS — R10.9 ABDOMINAL PAIN, UNSPECIFIED ABDOMINAL LOCATION: ICD-10-CM

## 2024-03-26 DIAGNOSIS — R07.9 CHEST PAIN, UNSPECIFIED TYPE: Primary | ICD-10-CM

## 2024-03-26 DIAGNOSIS — I50.22 CHRONIC SYSTOLIC CONGESTIVE HEART FAILURE, NYHA CLASS 2 (MULTI): ICD-10-CM

## 2024-03-26 DIAGNOSIS — I25.118 CORONARY ARTERY DISEASE OF NATIVE ARTERY OF NATIVE HEART WITH STABLE ANGINA PECTORIS (CMS-HCC): ICD-10-CM

## 2024-03-26 DIAGNOSIS — I65.23 BILATERAL CAROTID ARTERY STENOSIS: ICD-10-CM

## 2024-03-26 PROBLEM — Z95.0 PACEMAKER: Status: RESOLVED | Noted: 2023-10-01 | Resolved: 2024-03-26

## 2024-03-26 PROBLEM — I49.9 CARDIAC DYSRHYTHMIA: Status: RESOLVED | Noted: 2018-12-07 | Resolved: 2024-03-26

## 2024-03-26 LAB
ALBUMIN SERPL BCP-MCNC: 3.7 G/DL (ref 3.4–5)
ALP SERPL-CCNC: 66 U/L (ref 33–136)
ALT SERPL W P-5'-P-CCNC: 15 U/L (ref 10–52)
ANION GAP SERPL CALC-SCNC: 9 MMOL/L (ref 10–20)
APPEARANCE UR: CLEAR
AST SERPL W P-5'-P-CCNC: 31 U/L (ref 9–39)
ATRIAL RATE: 71 BPM
BASOPHILS # BLD AUTO: 0.03 X10*3/UL (ref 0–0.1)
BASOPHILS NFR BLD AUTO: 0.5 %
BILIRUB SERPL-MCNC: 0.3 MG/DL (ref 0–1.2)
BILIRUB UR STRIP.AUTO-MCNC: NEGATIVE MG/DL
BNP SERPL-MCNC: 139 PG/ML (ref 0–99)
BUN SERPL-MCNC: 27 MG/DL (ref 6–23)
CALCIUM SERPL-MCNC: 9.3 MG/DL (ref 8.6–10.3)
CARDIAC TROPONIN I PNL SERPL HS: 8 NG/L (ref 0–20)
CARDIAC TROPONIN I PNL SERPL HS: 8 NG/L (ref 0–20)
CHLORIDE SERPL-SCNC: 107 MMOL/L (ref 98–107)
CO2 SERPL-SCNC: 26 MMOL/L (ref 21–32)
COLOR UR: YELLOW
CREAT SERPL-MCNC: 2.06 MG/DL (ref 0.5–1.3)
EGFRCR SERPLBLD CKD-EPI 2021: 30 ML/MIN/1.73M*2
EOSINOPHIL # BLD AUTO: 0.23 X10*3/UL (ref 0–0.4)
EOSINOPHIL NFR BLD AUTO: 3.8 %
ERYTHROCYTE [DISTWIDTH] IN BLOOD BY AUTOMATED COUNT: 13 % (ref 11.5–14.5)
FLUAV RNA RESP QL NAA+PROBE: NOT DETECTED
FLUBV RNA RESP QL NAA+PROBE: NOT DETECTED
GLUCOSE SERPL-MCNC: 95 MG/DL (ref 74–99)
GLUCOSE UR STRIP.AUTO-MCNC: NEGATIVE MG/DL
HCT VFR BLD AUTO: 31.8 % (ref 41–52)
HGB BLD-MCNC: 10.6 G/DL (ref 13.5–17.5)
IMM GRANULOCYTES # BLD AUTO: 0.01 X10*3/UL (ref 0–0.5)
IMM GRANULOCYTES NFR BLD AUTO: 0.2 % (ref 0–0.9)
INR PPP: 1.1 (ref 0.9–1.1)
KETONES UR STRIP.AUTO-MCNC: ABNORMAL MG/DL
LACTATE SERPL-SCNC: 0.7 MMOL/L (ref 0.4–2)
LEUKOCYTE ESTERASE UR QL STRIP.AUTO: NEGATIVE
LIPASE SERPL-CCNC: 77 U/L (ref 9–82)
LYMPHOCYTES # BLD AUTO: 2.46 X10*3/UL (ref 0.8–3)
LYMPHOCYTES NFR BLD AUTO: 40.5 %
MAGNESIUM SERPL-MCNC: 2.04 MG/DL (ref 1.6–2.4)
MCH RBC QN AUTO: 35.8 PG (ref 26–34)
MCHC RBC AUTO-ENTMCNC: 33.3 G/DL (ref 32–36)
MCV RBC AUTO: 107 FL (ref 80–100)
MONOCYTES # BLD AUTO: 0.79 X10*3/UL (ref 0.05–0.8)
MONOCYTES NFR BLD AUTO: 13 %
NEUTROPHILS # BLD AUTO: 2.55 X10*3/UL (ref 1.6–5.5)
NEUTROPHILS NFR BLD AUTO: 42 %
NITRITE UR QL STRIP.AUTO: NEGATIVE
NRBC BLD-RTO: 0 /100 WBCS (ref 0–0)
PH UR STRIP.AUTO: 5 [PH]
PLATELET # BLD AUTO: 221 X10*3/UL (ref 150–450)
POTASSIUM SERPL-SCNC: 4.3 MMOL/L (ref 3.5–5.3)
PR INTERVAL: 170 MS
PROT SERPL-MCNC: 8.2 G/DL (ref 6.4–8.2)
PROT UR STRIP.AUTO-MCNC: ABNORMAL MG/DL
PROTHROMBIN TIME: 12.6 SECONDS (ref 9.8–12.8)
Q ONSET: 206 MS
QRS COUNT: 12 BEATS
QRS DURATION: 132 MS
QT INTERVAL: 412 MS
QTC CALCULATION(BAZETT): 447 MS
QTC FREDERICIA: 436 MS
R AXIS: -87 DEGREES
RBC # BLD AUTO: 2.96 X10*6/UL (ref 4.5–5.9)
RBC # UR STRIP.AUTO: NEGATIVE /UL
RBC #/AREA URNS AUTO: NORMAL /HPF
SARS-COV-2 RNA RESP QL NAA+PROBE: NOT DETECTED
SODIUM SERPL-SCNC: 138 MMOL/L (ref 136–145)
SP GR UR STRIP.AUTO: 1.02
T AXIS: 112 DEGREES
T OFFSET: 412 MS
UROBILINOGEN UR STRIP.AUTO-MCNC: <2 MG/DL
VENTRICULAR RATE: 71 BPM
WBC # BLD AUTO: 6.1 X10*3/UL (ref 4.4–11.3)
WBC #/AREA URNS AUTO: NORMAL /HPF

## 2024-03-26 PROCEDURE — 93005 ELECTROCARDIOGRAM TRACING: CPT

## 2024-03-26 PROCEDURE — 1159F MED LIST DOCD IN RCRD: CPT | Performed by: NURSE PRACTITIONER

## 2024-03-26 PROCEDURE — 93284 PRGRMG EVAL IMPLANTABLE DFB: CPT

## 2024-03-26 PROCEDURE — 3074F SYST BP LT 130 MM HG: CPT | Performed by: NURSE PRACTITIONER

## 2024-03-26 PROCEDURE — C9113 INJ PANTOPRAZOLE SODIUM, VIA: HCPCS | Performed by: PHYSICIAN ASSISTANT

## 2024-03-26 PROCEDURE — 83690 ASSAY OF LIPASE: CPT | Performed by: PHYSICIAN ASSISTANT

## 2024-03-26 PROCEDURE — 1160F RVW MEDS BY RX/DR IN RCRD: CPT | Performed by: NURSE PRACTITIONER

## 2024-03-26 PROCEDURE — 80053 COMPREHEN METABOLIC PANEL: CPT | Performed by: PHYSICIAN ASSISTANT

## 2024-03-26 PROCEDURE — 83880 ASSAY OF NATRIURETIC PEPTIDE: CPT | Performed by: PHYSICIAN ASSISTANT

## 2024-03-26 PROCEDURE — 83735 ASSAY OF MAGNESIUM: CPT | Performed by: PHYSICIAN ASSISTANT

## 2024-03-26 PROCEDURE — 2500000001 HC RX 250 WO HCPCS SELF ADMINISTERED DRUGS (ALT 637 FOR MEDICARE OP): Performed by: PHYSICIAN ASSISTANT

## 2024-03-26 PROCEDURE — 1036F TOBACCO NON-USER: CPT | Performed by: NURSE PRACTITIONER

## 2024-03-26 PROCEDURE — 85025 COMPLETE CBC W/AUTO DIFF WBC: CPT | Performed by: PHYSICIAN ASSISTANT

## 2024-03-26 PROCEDURE — 1157F ADVNC CARE PLAN IN RCRD: CPT | Performed by: NURSE PRACTITIONER

## 2024-03-26 PROCEDURE — 2500000004 HC RX 250 GENERAL PHARMACY W/ HCPCS (ALT 636 FOR OP/ED): Performed by: PHYSICIAN ASSISTANT

## 2024-03-26 PROCEDURE — 84484 ASSAY OF TROPONIN QUANT: CPT | Performed by: PHYSICIAN ASSISTANT

## 2024-03-26 PROCEDURE — 71045 X-RAY EXAM CHEST 1 VIEW: CPT | Performed by: RADIOLOGY

## 2024-03-26 PROCEDURE — 99215 OFFICE O/P EST HI 40 MIN: CPT | Performed by: NURSE PRACTITIONER

## 2024-03-26 PROCEDURE — 99285 EMERGENCY DEPT VISIT HI MDM: CPT | Mod: 25

## 2024-03-26 PROCEDURE — 36415 COLL VENOUS BLD VENIPUNCTURE: CPT | Performed by: PHYSICIAN ASSISTANT

## 2024-03-26 PROCEDURE — 81001 URINALYSIS AUTO W/SCOPE: CPT | Performed by: PHYSICIAN ASSISTANT

## 2024-03-26 PROCEDURE — 87636 SARSCOV2 & INF A&B AMP PRB: CPT | Performed by: PHYSICIAN ASSISTANT

## 2024-03-26 PROCEDURE — 83605 ASSAY OF LACTIC ACID: CPT | Performed by: PHYSICIAN ASSISTANT

## 2024-03-26 PROCEDURE — 74176 CT ABD & PELVIS W/O CONTRAST: CPT

## 2024-03-26 PROCEDURE — 85610 PROTHROMBIN TIME: CPT | Performed by: PHYSICIAN ASSISTANT

## 2024-03-26 PROCEDURE — 96361 HYDRATE IV INFUSION ADD-ON: CPT

## 2024-03-26 PROCEDURE — 93005 ELECTROCARDIOGRAM TRACING: CPT | Mod: 59

## 2024-03-26 PROCEDURE — 96374 THER/PROPH/DIAG INJ IV PUSH: CPT

## 2024-03-26 PROCEDURE — 71045 X-RAY EXAM CHEST 1 VIEW: CPT

## 2024-03-26 PROCEDURE — 96375 TX/PRO/DX INJ NEW DRUG ADDON: CPT

## 2024-03-26 PROCEDURE — 3078F DIAST BP <80 MM HG: CPT | Performed by: NURSE PRACTITIONER

## 2024-03-26 PROCEDURE — 93284 PRGRMG EVAL IMPLANTABLE DFB: CPT | Performed by: INTERNAL MEDICINE

## 2024-03-26 RX ORDER — NAPROXEN SODIUM 220 MG/1
324 TABLET, FILM COATED ORAL ONCE
Status: COMPLETED | OUTPATIENT
Start: 2024-03-26 | End: 2024-03-26

## 2024-03-26 RX ORDER — ONDANSETRON HYDROCHLORIDE 2 MG/ML
4 INJECTION, SOLUTION INTRAVENOUS ONCE
Status: COMPLETED | OUTPATIENT
Start: 2024-03-26 | End: 2024-03-26

## 2024-03-26 RX ORDER — PANTOPRAZOLE SODIUM 40 MG/10ML
40 INJECTION, POWDER, LYOPHILIZED, FOR SOLUTION INTRAVENOUS ONCE
Status: DISCONTINUED | OUTPATIENT
Start: 2024-03-26 | End: 2024-03-26

## 2024-03-26 RX ORDER — PANTOPRAZOLE SODIUM 40 MG/10ML
40 INJECTION, POWDER, LYOPHILIZED, FOR SOLUTION INTRAVENOUS ONCE
Status: COMPLETED | OUTPATIENT
Start: 2024-03-26 | End: 2024-03-26

## 2024-03-26 RX ORDER — PANTOPRAZOLE SODIUM 20 MG/1
40 TABLET, DELAYED RELEASE ORAL DAILY
Qty: 14 TABLET | Refills: 0 | Status: SHIPPED | OUTPATIENT
Start: 2024-03-26 | End: 2024-06-11 | Stop reason: WASHOUT

## 2024-03-26 RX ADMIN — ASPIRIN 324 MG: 81 TABLET, CHEWABLE ORAL at 14:40

## 2024-03-26 RX ADMIN — PANTOPRAZOLE SODIUM 40 MG: 40 INJECTION, POWDER, FOR SOLUTION INTRAVENOUS at 14:40

## 2024-03-26 RX ADMIN — SODIUM CHLORIDE 500 ML: 9 INJECTION, SOLUTION INTRAVENOUS at 14:40

## 2024-03-26 RX ADMIN — ONDANSETRON 4 MG: 2 INJECTION INTRAMUSCULAR; INTRAVENOUS at 14:40

## 2024-03-26 ASSESSMENT — HEART SCORE
HISTORY: SLIGHTLY SUSPICIOUS
AGE: 65+
TROPONIN: LESS THAN OR EQUAL TO NORMAL LIMIT
HEART SCORE: 4
RISK FACTORS: >2 RISK FACTORS OR HX OF ATHEROSCLEROTIC DISEASE
ECG: NORMAL

## 2024-03-26 ASSESSMENT — PAIN DESCRIPTION - LOCATION: LOCATION: CHEST

## 2024-03-26 ASSESSMENT — LIFESTYLE VARIABLES
HAVE PEOPLE ANNOYED YOU BY CRITICIZING YOUR DRINKING: NO
HAVE YOU EVER FELT YOU SHOULD CUT DOWN ON YOUR DRINKING: NO
EVER FELT BAD OR GUILTY ABOUT YOUR DRINKING: NO
TOTAL SCORE: 0
EVER HAD A DRINK FIRST THING IN THE MORNING TO STEADY YOUR NERVES TO GET RID OF A HANGOVER: NO

## 2024-03-26 ASSESSMENT — PAIN DESCRIPTION - DESCRIPTORS: DESCRIPTORS: ACHING

## 2024-03-26 ASSESSMENT — COLUMBIA-SUICIDE SEVERITY RATING SCALE - C-SSRS
6. HAVE YOU EVER DONE ANYTHING, STARTED TO DO ANYTHING, OR PREPARED TO DO ANYTHING TO END YOUR LIFE?: NO
2. HAVE YOU ACTUALLY HAD ANY THOUGHTS OF KILLING YOURSELF?: NO
1. IN THE PAST MONTH, HAVE YOU WISHED YOU WERE DEAD OR WISHED YOU COULD GO TO SLEEP AND NOT WAKE UP?: NO

## 2024-03-26 ASSESSMENT — PAIN - FUNCTIONAL ASSESSMENT: PAIN_FUNCTIONAL_ASSESSMENT: 0-10

## 2024-03-26 ASSESSMENT — PAIN DESCRIPTION - PAIN TYPE: TYPE: ACUTE PAIN

## 2024-03-26 ASSESSMENT — PAIN SCALES - GENERAL: PAINLEVEL_OUTOF10: 1

## 2024-03-26 NOTE — PROGRESS NOTES
"CARDIOLOGY OFFICE VISIT      CHIEF COMPLAINT  Chief Complaint   Patient presents with    Atrial Fibrillation     Chief complaint: \"I have been having chest pain, abdominal pain, jaw pain, and left arm pain off and on since yesterday.\"  HISTORY OF PRESENT ILLNESS  HPI  History: The patient is a 89-year-old -American male who is followed for complete heart block status post dual-chamber pacemaker implant on March 6, 2015 and upgrade to an AV biventricular ICD on August 24, 2017 for the treatment of refractory heart failure and primary prevention of sudden cardiac death.  He underwent ICD generator change out on September 26, 2023 for CANDI parameters.  He presents to the office today accompanied by his daughter stating that he has been having chest pain, abdominal pain, jaw pain, and left arm pain off and on since yesterday.  He states yesterday he was extremely warm but denies diaphoresis, palpitations, dizziness or lightheadedness.  He does experience some transient shortness of breath unrelated to activity.  He denies changes in bowel status.  The patient is accompanied by his daughter for today's office visit.  Past Medical History  Past Medical History:   Diagnosis Date    Encounter for follow-up examination after completed treatment for conditions other than malignant neoplasm 07/11/2022    Hospital discharge follow-up    Encounter for follow-up examination after completed treatment for conditions other than malignant neoplasm 05/04/2022    Hospital discharge follow-up    Hypertension     ICD (implantable cardioverter-defibrillator) in place     Insomnia disorder     Personal history of other diseases of the circulatory system 12/07/2022    History of hypertension    Personal history of other endocrine, nutritional and metabolic disease 12/07/2022    History of hyperlipidemia       Social History  Social History     Tobacco Use    Smoking status: Never    Smokeless tobacco: Never   Vaping Use    Vaping Use: " Never used   Substance Use Topics    Alcohol use: Not Currently    Drug use: Not Currently       Family History     Family History   Problem Relation Name Age of Onset    Cancer Father      Other (cardiac disorder) Sister          Allergies:  Allergies   Allergen Reactions    Codeine Unknown    Lisinopril Other and Unknown     Hyperkalemia    Penicillins Unknown        Outpatient Medications:  Current Outpatient Medications   Medication Instructions    aspirin 81 mg, oral, Daily    atorvastatin (LIPITOR) 80 mg, oral, Nightly    amilcar oil-levomenthol (FDgard) 25-20.75 mg capsule 1 capsule, oral, Every morning, may take additonal tablet in the evening if needed<BR>    carvedilol (COREG) 6.25 mg, oral, 2 times daily with meals    cholecalciferol (Vitamin D-3) 1,250 mcg (50,000 unit) capsule 1 capsule, oral, Weekly    digoxin (LANOXIN) 125 mcg, oral, Daily    divalproex (DEPAKOTE) 500 mg, oral, 3 times daily, AFTER MEALS.    docusate sodium (COLACE) 100 mg, oral, Daily PRN    folic acid (Folvite) 1 mg tablet 1 tablet, oral, Daily    furosemide (LASIX) 20 mg, oral, Every other day    hydrALAZINE (Apresoline) 50 mg tablet Take ( 1) 50 mg tablet twice day one in AM and one at bedtime,  Take (2) 50 mg tablets in afternoon to total 100 mg    meclizine (ANTIVERT) 25 mg, oral, 3 times daily PRN    mirtazapine (REMERON) 15 mg, oral, Nightly    multivit-min/ferrous fumarate (MULTI VITAMIN ORAL) 1 tablet, oral, Daily    nitroglycerin (NITROSTAT) 0.4 mg, sublingual, Every 5 min PRN    ondansetron (ZOFRAN) 4 mg, oral, 3 times daily PRN    pantoprazole (PROTONIX) 40 mg, oral, Daily    tamsulosin (FLOMAX) 0.4 mg, oral, Daily          REVIEW OF SYSTEMS  Review of Systems   All other systems reviewed and are negative.        VITALS  Vitals:    03/26/24 1305   BP: 128/64   Pulse: 73       PHYSICAL EXAM  Vitals and nursing note reviewed.   Constitutional:       Appearance: Normal appearance.   HENT:      Head: Normocephalic.   Neck:       Vascular: No JVD.   Cardiovascular:      Rate and Rhythm: Normal rate and regular rhythm.      Pulses: Normal pulses.      Heart sounds: Normal heart sounds.   Pulmonary:      Effort: Pulmonary effort is normal.      Breath sounds: Normal breath sounds.   Abdominal:      General: Bowel sounds are normal.      Palpations: Abdomen is soft.   Musculoskeletal:         General: Normal range of motion.      Cervical back: Normal range of motion.   Skin:     General: Skin is warm and dry.  Left subclavian ICD pocket is well-healed without redness swelling or drainage.  Neurological:      General: No focal deficit present.      Mental Status: She is alert and oriented to person, place, and time.      Motor: Motor function is intact.   Psychiatric:         Attention and Perception: Attention and perception normal.         Mood and Affect: Mood and affect normal.         Speech: Speech normal.         Behavior: Behavior normal. Behavior is cooperative.         Thought Content: Thought content normal.         Cognition and Memory: Cognition and memory normal.     Labs and testing: Twelve-lead EKG reveals atrial and ventricular pacing 73 bpm.  QRS durations 130 ms,  ms, QTc 445 ms.  1 PVC is noted in the recording.  ICD interrogation performed in the office today revealed atrial pacing 76% and biventricular pacing 100%.  No ventricular arrhythmic events were noted.  2 mode switching episodes were noted with a burden of less than 1%.  2D echocardiogram dated March 21, 2024 reveals normal left ventricular function, moderate to severe LVH, moderate mitral valve thickening, right ventricular systolic pressure 29 mmHg, calcified aortic valve with an aortic valve area of 0.9 to 1 cm² consistent with moderate to severe calcific aortic stenosis.      ASSESSMENT AND PLAN    Clinical impressions:  1. Complete heart block status post dual-chamber pacemaker implant on March 6, 2015 and upgrade to an AV biventricular ICD ([employer] CRT-D SD) on August 24, 2017 currently at elective replacement indicator per device interrogation dated August 25, 2023.  2. Ischemic cardiomyopathy with a left ventricular ejection fraction improved to normal per 2D echocardiogram dated March 21, 2024 New [place] Heart Association class II, stage C heart failure.  3. Coronary artery disease with left heart catheterization dated November 13, 2018 revealing 20% mid LAD with patent stent, 30% mid circumflex, 20% proximal, mid, and distal RCA stenosis with recommendation for medical management.  4. Paroxysmal atrial fibrillation controlled on beta-blockade and presently not anticoagulated due to low arrhythmic burden.  5. Dyslipidemia on statin.  6. Anxiety disorder.  7. Hypertension controlled with a blood pressure today of 104/58.    Recommendations:  1.  I discussed with the patient and his daughter undergoing evaluation in the emergency room due to the patient's ongoing complaints of chest pain, jaw pain, left arm pain, and abdominal pain with history of ischemic cardiomyopathy.  Patient and his daughter are in agreement with the plan.  Patient was transported to the hospital via squad.  They are provided with a copy of the twelve-lead EKG from today, the current medication list, and a recent office visit.  2.  Follow-ups will be pending the clinical course.    Evaluation and note by Cele Marcelino CNP  **Please excuse any errors in grammar or translation related to this dictation.  Voice recognition software was utilized to prepare this document.**

## 2024-03-26 NOTE — ED PROVIDER NOTES
HPI   Chief Complaint   Patient presents with    Chest Pain     R cp per pt, worse when he breaths in        A 89-year-old male patient with history of ICD, CKD, hypertension, dementia comes into the emergency department today with complaints of nausea, vomiting, abdominal discomfort starting yesterday.  States the went to his chest into his left arm.  Saw his doctor today who sent him over to the emergency department for further evaluation.  Patient denies any shortness of breath, fevers, chills.  Denies feeling like his defibrillator fired.  He otherwise has no other complaints this present time.  For this purpose comes in the emergency department today further evaluation.                          [place] Coma Scale Score: 15   HEART Score: 4                   Patient History   Past Medical History:   Diagnosis Date    Encounter for follow-up examination after completed treatment for conditions other than malignant neoplasm 07/11/2022    Hospital discharge follow-up    Encounter for follow-up examination after completed treatment for conditions other than malignant neoplasm 05/04/2022    Hospital discharge follow-up    Hypertension     ICD (implantable cardioverter-defibrillator) in place     Insomnia disorder     Personal history of other diseases of the circulatory system 12/07/2022    History of hypertension    Personal history of other endocrine, nutritional and metabolic disease 12/07/2022    History of hyperlipidemia     Past Surgical History:   Procedure Laterality Date    CT ANGIO NECK  3/10/2023    CT NECK ANGIO W AND WO IV CONTRAST 3/10/2023    CT HEAD ANGIO W AND WO IV CONTRAST  3/10/2023    CT HEAD ANGIO W AND WO IV CONTRAST 3/10/2023    OTHER SURGICAL HISTORY  11/10/2021    Surgery    OTHER SURGICAL HISTORY  11/10/2021    Cataract surgery    OTHER SURGICAL HISTORY  11/10/2021    Inguinal hernia repair    OTHER SURGICAL HISTORY  11/10/2021    Pacemaker insertion    OTHER SURGICAL HISTORY  11/10/2021     Esophagogastroduodenoscopy    OTHER SURGICAL HISTORY  05/04/2022    Prostate surgery    OTHER SURGICAL HISTORY  08/30/2022    Pancreatectomy    OTHER SURGICAL HISTORY  08/30/2022    Prostatectomy    OTHER SURGICAL HISTORY  03/29/2022    Pancreatic surgery    OTHER SURGICAL HISTORY  03/03/2022    Appendectomy     Family History   Problem Relation Name Age of Onset    Cancer Father      Other (cardiac disorder) Sister       Social History     Tobacco Use    Smoking status: Never    Smokeless tobacco: Never   Vaping Use    Vaping Use: Never used   Substance Use Topics    Alcohol use: Not Currently    Drug use: Not Currently       Physical Exam   ED Triage Vitals [03/26/24 1410]   Temperature Heart Rate Respirations BP   36.7 °C (98.1 °F) 71 17 169/75      Pulse Ox Temp Source Heart Rate Source Patient Position   100 % Temporal Monitor --      BP Location FiO2 (%)     -- --       Physical Exam  Constitutional:       Appearance: Normal appearance. He is well-developed.   HENT:      Head: Normocephalic and atraumatic.      Nose: Nose normal.   Eyes:      Extraocular Movements: Extraocular movements intact.      Conjunctiva/sclera: Conjunctivae normal.      Pupils: Pupils are equal, round, and reactive to light.   Cardiovascular:      Rate and Rhythm: Normal rate and regular rhythm.      Heart sounds: Murmur heard.   Pulmonary:      Effort: Pulmonary effort is normal. No respiratory distress.      Breath sounds: Normal breath sounds. No stridor. No wheezing.   Abdominal:      General: Bowel sounds are normal.      Palpations: Abdomen is soft.      Tenderness: There is no abdominal tenderness. There is no guarding.   Musculoskeletal:         General: Normal range of motion.      Cervical back: Normal range of motion.   Skin:     General: Skin is warm and dry.   Neurological:      General: No focal deficit present.      Mental Status: He is alert and oriented to person, place, and time. Mental status is at baseline.    Psychiatric:         Mood and Affect: Mood normal.         ED Course & MDM   Diagnoses as of 03/26/24 1616   Chest pain, unspecified type   Abdominal pain, unspecified abdominal location       Medical Decision Making  A 89-year-old male patient with history of ICD, CKD, hypertension, dementia comes into the emergency department today with complaints of nausea, vomiting, abdominal discomfort starting yesterday.  States the went to his chest into his left arm.  Saw his doctor today who sent him over to the emergency department for further evaluation.  Patient denies any shortness of breath, fevers, chills.  Denies feeling like his defibrillator fired.  He otherwise has no other complaints this present time.  For this purpose comes in the emergency department today further evaluation.    EKG, chest x-ray, laboratory studies are ordered to rule out ACS, arrhythmias, electrolyte abnormalities, leukocytosis or left shift.  Right pneumonia, pneumothorax, pulmonary congestion.  Rule out UTI.  Interrogation of patient's pacemaker ordered.  P.o. aspirin, IV fluids, IV Zofran and IV Protonix ordered for the patient.    Patient's troponin negative at 8 lipase 77 negative for influenza COVID-19.  .  Patient's creatinine at 2.06 GFR 30 which is worse for the patient based off baseline.  Patient's white blood cell count at 6.1 no left shift.  Chest x-ray is negative as well.  CT study abdomen pelvis ordered to rule out any acute intra-abdominal surgical need include pancreatitis, diverticulitis, colitis.    Handoff to Jan Nolan PA-C pending CT study, reevaluation disposition      Labs Reviewed   CBC WITH AUTO DIFFERENTIAL - Abnormal       Result Value    WBC 6.1      nRBC 0.0      RBC 2.96 (*)     Hemoglobin 10.6 (*)     Hematocrit 31.8 (*)      (*)     MCH 35.8 (*)     MCHC 33.3      RDW 13.0      Platelets 221      Neutrophils % 42.0      Immature Granulocytes %, Automated 0.2      Lymphocytes % 40.5       Monocytes % 13.0      Eosinophils % 3.8      Basophils % 0.5      Neutrophils Absolute 2.55      Immature Granulocytes Absolute, Automated 0.01      Lymphocytes Absolute 2.46      Monocytes Absolute 0.79      Eosinophils Absolute 0.23      Basophils Absolute 0.03     COMPREHENSIVE METABOLIC PANEL - Abnormal    Glucose 95      Sodium 138      Potassium 4.3      Chloride 107      Bicarbonate 26      Anion Gap 9 (*)     Urea Nitrogen 27 (*)     Creatinine 2.06 (*)     eGFR 30 (*)     Calcium 9.3      Albumin 3.7      Alkaline Phosphatase 66      Total Protein 8.2      AST 31      Bilirubin, Total 0.3      ALT 15     B-TYPE NATRIURETIC PEPTIDE - Abnormal     (*)     Narrative:        <100 pg/mL - Heart failure unlikely  100-299 pg/mL - Intermediate probability of acute heart                  failure exacerbation. Correlate with clinical                  context and patient history.    >=300 pg/mL - Heart Failure likely. Correlate with clinical                  context and patient history.    BNP testing is performed using different testing methodology at [facility] than at other [facility]. Direct result comparisons should only be made within the same method.      MAGNESIUM - Normal    Magnesium 2.04     LACTATE - Normal    Lactate 0.7      Narrative:     Venipuncture immediately after or during the administration of Metamizole may lead to falsely low results. Testing should be performed immediately  prior to Metamizole dosing.   PROTIME-INR - Normal    Protime 12.6      INR 1.1     LIPASE - Normal    Lipase 77      Narrative:     Venipuncture immediately after or during the administration of Metamizole may lead to falsely low results. Testing should be performed immediately prior to Metamizole dosing.   SARS-COV-2 AND INFLUENZA A/B PCR - Normal    Flu A Result Not Detected      Flu B Result Not Detected      Coronavirus 2019, PCR Not Detected      Narrative:     This assay has received FDA  Emergency Use Authorization (EUA) and  is only authorized for the duration of time that circumstances exist to justify the authorization of the emergency use of in vitro diagnostic tests for the detection of SARS-CoV-2 virus and/or diagnosis of COVID-19 infection under section 564(b)(1) of the Act, 21 U.S.C. 360bbb-3(b)(1). Testing for SARS-CoV-2 is only recommended for patients who meet current clinical and/or epidemiological criteria as defined by federal, state, or local public health directives. This assay is an in vitro diagnostic nucleic acid amplification test for the qualitative detection of SARS-CoV-2, Influenza A, and Influenza B from nasopharyngeal specimens and has been validated for use at [facility]. Negative results do not preclude COVID-19 infections or Influenza A/B infections, and should not be used as the sole basis for diagnosis, treatment, or other management decisions. If Influenza A/B and RSV PCR results are negative, testing for Parainfluenza virus, Adenovirus and Metapneumovirus is routinely performed for [facility] and intensive care inpatients, and is available on other patients by placing an add-on request.    SERIAL TROPONIN-INITIAL - Normal    Troponin I, High Sensitivity 8      Narrative:     Less than 99th percentile of normal range cutoff-  Female and children under 18 years old <14 ng/L; Male <21 ng/L: Negative  Repeat testing should be performed if clinically indicated.     Female and children under 18 years old 14-50 ng/L; Male 21-50 ng/L:  Consistent with possible cardiac damage and possible increased clinical   risk. Serial measurements may help to assess extent of myocardial damage.     >50 ng/L: Consistent with cardiac damage, increased clinical risk and  myocardial infarction. Serial measurements may help assess extent of   myocardial damage.      NOTE: Children less than 1 year old may have higher baseline troponin   levels and results  should be interpreted in conjunction with the overall   clinical context.     NOTE: Troponin I testing is performed using a different   testing methodology at [facility] than at other   [facility]. Direct result comparisons should only   be made within the same method.   SERIAL TROPONIN, 1 HOUR - Normal    Troponin I, High Sensitivity 8      Narrative:     Less than 99th percentile of normal range cutoff-  Female and children under 18 years old <14 ng/L; Male <21 ng/L: Negative  Repeat testing should be performed if clinically indicated.     Female and children under 18 years old 14-50 ng/L; Male 21-50 ng/L:  Consistent with possible cardiac damage and possible increased clinical   risk. Serial measurements may help to assess extent of myocardial damage.     >50 ng/L: Consistent with cardiac damage, increased clinical risk and  myocardial infarction. Serial measurements may help assess extent of   myocardial damage.      NOTE: Children less than 1 year old may have higher baseline troponin   levels and results should be interpreted in conjunction with the overall   clinical context.     NOTE: Troponin I testing is performed using a different   testing methodology at [facility] than at other   [facility]. Direct result comparisons should only   be made within the same method.   TROPONIN SERIES- (INITIAL, 1 HR)    Narrative:     The following orders were created for panel order Troponin I Series, High Sensitivity (0, 1 HR).  Procedure                               Abnormality         Status                     ---------                               -----------         ------                     Troponin I, High Sensiti...[[id number]]  Normal              Final result               Troponin, High Sensitivi...[[id number]]  Normal              Final result                 Please view results for these tests on the individual orders.   URINALYSIS WITH REFLEX CULTURE AND MICROSCOPIC     Narrative:     The following orders were created for panel order Urinalysis with Reflex Culture and Microscopic.  Procedure                               Abnormality         Status                     ---------                               -----------         ------                     Urinalysis with Reflex C...[[id number]]                                                 Extra Urine Gray Tube[[id number]]                                                         Please view results for these tests on the individual orders.   URINALYSIS WITH REFLEX CULTURE AND MICROSCOPIC   EXTRA URINE GRAY TUBE        Cardiac Device Check - Inpatient         XR chest 1 view   Final Result   1.  Stable chest. See discussion above.                  MACRO:   None        Signed by: Joseph Schoenberger 3/26/2024 2:54 PM   Dictation workstation:   [id number]      CT abdomen pelvis wo IV contrast    (Results Pending)         Procedure  ECG 12 lead    Performed by: Harjeet Burrell PA-C  Authorized by: Harjeet Burrell PA-C    Interpretation:     Details:  My EKG interpretation  Rate:     ECG rate:  71  Rhythm:     Rhythm: paced    ST segments:     ST segments:  Normal       Harjeet Burrell PA-C  03/26/24 9982

## 2024-03-26 NOTE — PROGRESS NOTES
"Emergency Medicine Transition of Care Note.    I received Oneil William in signout from Anup Burrell PA-C.   Please see the previous ED provider note for all HPI, PE and MDM up to the time of signout at 4 PM. This is in addition to the primary record.    In brief Oneil William is an 89 y.o. male presenting for multiple medical complaints.  Patient states that he has been having this intermittent chest pain abdominal pain for the last few weeks.  He characterized the abdominal pain as \"burning \"and localized to the middle of his stomach.  Says that it radiates up into his chest.  He is having some improvement after Protonix.  Also reports intermittent nausea and vomiting.  Denies tearing or shredding pain.  Denies pleuritic discomfort or hemoptysis.  Denies cough and flulike symptoms..  His most recent patient has a history of CAD but is never required stent placement or bypass stress test was done in October 2022.  He had an echo done in December 2023.  He does have a history of A-fib and is not anticoagulated.  Does have a history of CHF and takes Lasix, denies any worsening swelling in extremities.  Denies blood in urine or stool, urgency, frequency, dysuria.  Denies weakness or fatigue.  Does report intermittent shortness of breath.  Patient states that when the chest pain is severe enough it goes into his jaw and into his left arm and makes him diaphoretic.  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  He most recently had a CTA in February.  He denies any numbness or tingling the extremities.  There are no neurological deficits on exam.  Is resting comfortably and in no acute distress.  Abdomen is soft, nontender, nondistended.  His breath sounds are clear and equal bilaterally, 97% on room air.  No muffled heart sounds, JVD, murmur.    Awaiting remaining lab and imaging results and disposition.  He does not have he " does have an internal defibrillator and pacemaker.  Original repeat troponin within normal limits but EKG was performed interpreted by my colleague which inclusion showed no sign of acute ischemia arrhythmia.  Lipase 77.  Swabs are negative.  BNP is 139.  Lactate is 0.7.  Magnesium is 2.4.  CMP shows worsening kidney function with a creatinine of 2.06, was given 1/2 L of IV normal saline.  Additional fluids withheld given that the patient does have a history of CHF.  Patient has a macrocytic anemia not similar to baseline.  Patient had a few brief episodes of atrial arrhythmia but no ventricular arrhythmia.  Otherwise his pacemaker interrogation was unremarkable.  Patient has some chronic changes seen on his chest x-ray but no pulm edema, pleural effusion, pneumonia, pneumothorax.  Low suspicion for PE at this time given that the patient has had this pain intermittently for the last few weeks, he is not hypoxic or tachycardic and has no history of blood clots.  Wells criteria is 0.    Urinalysis reveals 1+ protein and trace ketones.  CT abdomen pelvis reveals no acute abnormalities.    I did discuss this case with the patient's admitting physician and primary care provider, Dr. Alford.  I recommended admission given that the patient has been having worsening chest pain that now radiates to his jaw and his arm with diaphoresis.  After an extensive conversation, Dr. Alford would like him discharged with follow-up in his office tomorrow.  Does not believe he would have any additional benefit from admission at this time.  He is very familiar with this patient.  Patient is agreeable with this plan and has no complaints at this time.  He will follow-up with his primary care provider first thing in the morning.  He will be discharged with Protonix and very strict return precautions.  All questions and concerns addressed. Patient verbalized understanding and agreement with the treatment plan and they remained  hemodynamically stable in the ER.      Diagnoses as of 03/26/24 1632   Chest pain, unspecified type   Abdominal pain, unspecified abdominal location       Medical Decision Making      Final diagnoses:   [R07.9] Chest pain, unspecified type   [R10.9] Abdominal pain, unspecified abdominal location           Procedure  Procedures    Jan Nolan PA-C

## 2024-03-27 LAB — HOLD SPECIMEN: NORMAL

## 2024-04-04 DIAGNOSIS — Z95.810 ICD (IMPLANTABLE CARDIOVERTER-DEFIBRILLATOR) IN PLACE: ICD-10-CM

## 2024-04-04 PROCEDURE — 93295 DEV INTERROG REMOTE 1/2/MLT: CPT | Performed by: INTERNAL MEDICINE

## 2024-04-04 NOTE — PROGRESS NOTES
Rec'd fax from [facility] device clinic requesting  order for services rendered.  Order entered for date of service rendered and x next year for routine device checks in-clinic and remotely or as directed by physician.

## 2024-05-08 ENCOUNTER — OFFICE VISIT (OUTPATIENT)
Dept: CARDIOLOGY | Facility: CLINIC | Age: 89
End: 2024-05-08
Payer: MEDICARE

## 2024-05-08 ENCOUNTER — APPOINTMENT (OUTPATIENT)
Dept: CARDIOLOGY | Facility: CLINIC | Age: 89
End: 2024-05-08
Payer: MEDICARE

## 2024-05-08 VITALS
HEART RATE: 80 BPM | HEIGHT: 65 IN | BODY MASS INDEX: 26.26 KG/M2 | DIASTOLIC BLOOD PRESSURE: 78 MMHG | WEIGHT: 157.6 LBS | SYSTOLIC BLOOD PRESSURE: 150 MMHG

## 2024-05-08 DIAGNOSIS — R07.9 CHEST PAIN, UNSPECIFIED TYPE: Primary | ICD-10-CM

## 2024-05-08 DIAGNOSIS — I10 PRIMARY HYPERTENSION: ICD-10-CM

## 2024-05-08 DIAGNOSIS — R06.02 SHORTNESS OF BREATH: ICD-10-CM

## 2024-05-08 DIAGNOSIS — R68.84 JAW PAIN: ICD-10-CM

## 2024-05-08 PROCEDURE — 1160F RVW MEDS BY RX/DR IN RCRD: CPT | Performed by: INTERNAL MEDICINE

## 2024-05-08 PROCEDURE — 3077F SYST BP >= 140 MM HG: CPT | Performed by: INTERNAL MEDICINE

## 2024-05-08 PROCEDURE — 3078F DIAST BP <80 MM HG: CPT | Performed by: INTERNAL MEDICINE

## 2024-05-08 PROCEDURE — 99214 OFFICE O/P EST MOD 30 MIN: CPT | Performed by: INTERNAL MEDICINE

## 2024-05-08 PROCEDURE — 1157F ADVNC CARE PLAN IN RCRD: CPT | Performed by: INTERNAL MEDICINE

## 2024-05-08 PROCEDURE — 1159F MED LIST DOCD IN RCRD: CPT | Performed by: INTERNAL MEDICINE

## 2024-05-08 RX ORDER — CARVEDILOL 12.5 MG/1
12.5 TABLET ORAL
Qty: 180 TABLET | Refills: 3 | Status: SHIPPED | OUTPATIENT
Start: 2024-05-08 | End: 2025-05-08

## 2024-05-08 NOTE — PROGRESS NOTES
Patient:  Oneil William  YOB: 1933  MRN: [id number]       Impression/Plan:     Diagnoses and all orders for this visit:  Chest pain, unspecified type  -     He is neck pain is clearly musculoskeletal.  The pain in his jaw is clearly related to his jaw and is not radiation of angina.  -    Shortness of breath may be related to poorly controlled blood pressure anginal equivalent seems unlikely but cannot be completely excluded and some of his chest tightness is somewhat difficult for him to describe and vague.  Last perfusion study over a year ago  -     Nuclear Stress Test to assure no progressive disease  Shortness of breath  Primary hypertension        -     I suspect that his shortness of breath is related to poorly controlled blood pressure therefore increase carvedilol  -     carvedilol (Coreg) 12.5 mg tablet; Take 1 tablet (12.5 mg) by mouth 2 times a day with meals.  Jaw pain        -      Clearly pathology with jaw versus gum versus teeth versus TMJ.  Have referred him to see his dentist in the near future.        -      This is not anginal equivalent      Neck pain is musculoskeletal probably cervical spine arthritis    Follow-up with Dr. Dillon after testing    Chief Complaint/Active Symptoms:       Oneil William is a 89 y.o. male who presents with jaw and chest and neck pain.  He regularly follows with Dr. Dillon but was added on for his symptom concerns..      3/26/2024 seen by electrophysiology pacemaker functioning well but describe chest discomfort sent to the emergency department.  Troponins were unremarkable at that time.  KG without ischemia.  No high risk features and patient was discharged from the emergency room at .    3/29/2024 again in the emergency department with easiness this time at [facility] described vertigo at that center.  Had no cardiovascular symptoms.  ECG showed paced rhythm.  Troponins at baseline.  Hope to have vertigo and was discharged home             Seen  by neurology Dr. Presley 4/10/2024 described occasional vertigo but otherwise no new neurologic symptoms.  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  On low-dose Depakote as higher dose apparently gave tremors.  [place] to be stable and at 1 year follow-up arranged.  Described as having mild cognitive changes as well.    Was in [facility] emergency room 4/16/2024 with description of abdominal pain.  Some mild nausea no cardiovascular complaints.  ET abdomen unremarkable.  Specific pathology identified though concerns for constipation raised given CT findings.  Was discharged with outpatient follow-up.    Currently describes pain that he thought must be coming from his heart because it goes into his jaw.  He says both upper and lower jaws painful.  It hurts when he pushes on his jaw.  Sometimes if he eats it sometimes hurts.  He also notes some vague chest fullness that does not necessarily radiate to his jaw.  He says he may have pain in his neck and chest at the same time.  None of these are exertional.  He says it may feel like some of the pain he has had in the past when he has had progressive heart disease.  He says sometimes his shoulders hurt as well.  His neck hurts more when he turns his head.  Neck is also tender to touch.  He said his left jaw was more swollen as well.    Review of Systems: Unremarkable except as noted above    Meds     Current Outpatient Medications   Medication Instructions    aspirin 81 mg, oral, Daily    atorvastatin (LIPITOR) 80 mg, oral, Nightly    amilcar oil-levomenthol (FDgard) 25-20.75 mg capsule 1 capsule, oral, Every morning, may take additonal tablet in the evening if needed<BR>    carvedilol (COREG) 6.25 mg, oral, 2 times daily with meals    cholecalciferol (Vitamin D-3) 1,250 mcg (50,000 unit) capsule 1 capsule, oral, Once Weekly    digoxin (LANOXIN) 125 mcg, oral, Daily    divalproex (DEPAKOTE) 500 mg, oral, 3 times daily, AFTER MEALS.    docusate sodium  (COLACE) 100 mg, oral, Daily PRN    folic acid (Folvite) 1 mg tablet 1 tablet, oral, Daily    furosemide (LASIX) 20 mg, oral, Every other day    hydrALAZINE (Apresoline) 50 mg tablet Take ( 1) 50 mg tablet twice day one in AM and one at bedtime,  Take (2) 50 mg tablets in afternoon to total 100 mg    meclizine (ANTIVERT) 25 mg, oral, 3 times daily PRN    mirtazapine (REMERON) 15 mg, oral, Nightly    multivit-min/ferrous fumarate (MULTI VITAMIN ORAL) 1 tablet, oral, Daily    nitroglycerin (NITROSTAT) 0.4 mg, sublingual, Every 5 min PRN    ondansetron (ZOFRAN) 4 mg, oral, 3 times daily PRN    pantoprazole (PROTONIX) 40 mg, oral, Daily    pantoprazole (PROTONIX) 40 mg, oral, Daily, Do not crush, chew, or split.        Allergies     Allergies   Allergen Reactions    Codeine Unknown    Lisinopril Other and Unknown     Hyperkalemia    Penicillins Unknown         Annotated Problems     Specialty Problems          Cardiology Problems    Systolic congestive heart failure, NYHA class 2 (Multi)    Atrial fibrillation (Multi)    Bilateral carotid artery disease (CMS-HCC)    CAD (coronary artery disease)    Cardiomyopathy (Multi)    Hypertension    ICD (implantable cardioverter-defibrillator) in place    Aortic systolic murmur on examination        Problem List     Patient Active Problem List    Diagnosis Date Noted    Benign hypertensive kidney disease with chronic kidney disease stage I through stage IV, or unspecified(403.10) 02/21/2024    Aortic systolic murmur on examination 02/21/2024    Hospital discharge follow-up 12/30/2023    DDD (degenerative disc disease), cervical 10/01/2023    Anxiety 10/01/2023    Atrial fibrillation (Multi) 10/01/2023    Benign enlargement of prostate 10/01/2023    Bilateral carotid artery disease (CMS-HCC) 10/01/2023    Cardiomyopathy (Multi) 10/01/2023    Chronic kidney disease, stage 2 (mild) 10/01/2023    Chronic right shoulder pain 10/01/2023    GERD (gastroesophageal reflux disease)  "10/01/2023    ICD (implantable cardioverter-defibrillator) in place 10/01/2023    Moderate dementia (Multi) 10/01/2023    Peripheral neuropathy 10/01/2023    Seizure disorder (Multi) 10/01/2023    Stage 3a chronic kidney disease (Multi) 10/01/2023    Vitamin B12 deficiency 10/01/2023    Vitamin D deficiency disease 10/01/2023    At risk for fall due to comorbid condition 10/01/2023    Body mass index (BMI) of 24.0 to 24.9 in adult 10/01/2023    CAD (coronary artery disease) 10/01/2023    Hypertension 10/01/2023    Insomnia 10/01/2023    Iron deficiency 10/01/2023    Psychosomatic disorder 10/01/2023    Systolic congestive heart failure, NYHA class 2 (Multi) 12/07/2018    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    Epiretinal membrane 03/09/2016       Objective:     Vitals:    05/08/24 1012   BP: 150/78   BP Location: Left arm   Patient Position: Sitting   Pulse: 80   Weight: 71.5 kg (157 lb 9.6 oz)   Height: 1.651 m (5' 5\")      Wt Readings from Last 4 Encounters:   05/08/24 71.5 kg (157 lb 9.6 oz)   03/26/24 70.8 kg (156 lb)   03/26/24 70.3 kg (155 lb)   02/21/24 68.5 kg (151 lb)           LAB:     Lab Results   Component Value Date    WBC 6.1 03/26/2024    HGB 10.6 (L) 03/26/2024    HCT 31.8 (L) 03/26/2024     03/26/2024    ALT 15 03/26/2024    AST 31 03/26/2024     03/26/2024    K 4.3 03/26/2024     03/26/2024    CREATININE 2.06 (H) 03/26/2024    BUN 27 (H) 03/26/2024    CO2 26 03/26/2024    INR 1.1 03/26/2024       Diagnostic Studies:     CT abdomen pelvis wo IV contrast    Result Date: 3/26/2024  Interpreted By:  Dalila Silva, STUDY: CT ABDOMEN PELVIS WO IV CONTRAST;  3/26/2024 4:28 pm   INDICATION: Signs/Symptoms:Nausea, vomiting.   COMPARISON: None   ACCESSION NUMBER(S): [id number]   ORDERING CLINICIAN: PRIYA MCCARTHY   TECHNIQUE: CT of the abdomen and pelvis was performed. Contiguous axial images were obtained at 3 mm slice thickness through the abdomen and pelvis. Coronal and " sagittal reconstructions at 3 mm slice thickness were performed.  No intravenous or oral contrast was administered.   FINDINGS: Please note that the evaluation of vessels, lymph nodes and organs is limited without intravenous contrast.   LOWER CHEST: Hypoventilatory changes are noted in the dependent portions of the lungs. There is mild bronchiectasis. No focal pneumonia is noted. There is no pleural effusion. Heart is nonenlarged. Electrode wires are noted in right atrium and right ventricle. Aorta is atherosclerotic and there are diffuse coronary calcifications. Coronary stents are also noted.   ABDOMEN:   LIVER: The liver is normal in size. A coarse calcification is noted superficially along the inferolateral aspect of the right hepatic lobe.   BILE DUCTS: The intrahepatic and extrahepatic ducts are not dilated.   GALLBLADDER: Gallbladder is incompletely distended. No calcified stones are noted.   PANCREAS: The pancreas appears unremarkable without evidence of ductal dilatation or masses.   SPLEEN: Spleen is small. No focal mass is noted.   ADRENAL GLANDS: Adrenal glands appear normal.   KIDNEYS AND URETERS: Right kidney is normal in appearance with no hydronephrosis. Left kidney is atrophic. 1.7 cm cyst is noted at the upper pole and a subcentimeter cyst is present mid left kidney.   PELVIS:   BLADDER: Bladder is partially filled with no bladder wall thickening noted.   REPRODUCTIVE ORGANS: Fundal defect is noted in the prostate consistent with prior TURP. Prostate measures 5.8 x 4.7 x 4.4 cm. Penile prosthesis is present.   BOWEL: Stomach is decompressed. Small bowel is nondilated. Fecal content is present diffusely in the colon. Appendix is surgically absent.   VESSELS: Aorta and iliac arteries are diffusely atherosclerotic. There is no aneurysmal dilatation. Vena cava is normal in size.   PERITONEUM/RETROPERITONEUM/LYMPH NODES: There is no ascites, pneumoperitoneum or mesenteric inflammation. There are no  pathologically enlarged retroperitoneal lymph nodes.   ABDOMINAL WALL: Fat containing indirect inguinal hernias are noted.   BONES: No acute osseous abnormality is identified. Hypertrophic degenerative changes are noted in the spine.       1.  Atrophic left kidney 2. No acute finding to explain the patient's symptoms     MACRO: None   Signed by: Dalila Silva 3/26/2024 4:48 PM Dictation workstation:   [id number]    ECG 12 lead    Result Date: 3/26/2024  Atrial-paced rhythm Left axis deviation Right bundle branch block Minimal voltage criteria for LVH, may be normal variant Inferior infarct , age undetermined Possible Anterolateral infarct , age undetermined Abnormal ECG When compared with ECG of 26-SEP-2023 07:16, Electronic atrial pacemaker has replaced Electronic ventricular pacemaker See ED provider note for full interpretation and clinical correlation Confirmed by Soraida Olguin ([id number]) on 3/26/2024 3:41:13 PM    XR chest 1 view    Result Date: 3/26/2024  Interpreted By:  Schoenberger, Joseph, STUDY: XR CHEST 1 VIEW;  3/26/2024 2:45 pm   INDICATION: Signs/Symptoms:Chest Pain.   COMPARISON: 09/26/2023   ACCESSION NUMBER(S): [id number]   ORDERING CLINICIAN: PRIYA MCCARTHY   FINDINGS:     Left subclavian transvenous ICD/pacemaker unchanged.   CARDIOMEDIASTINAL SILHOUETTE: Cardiomediastinal silhouette is normal in size and configuration.   LUNGS: No new focal lung opacities are identified. Elevation left hemidiaphragm unchanged. Coarsened central lung markings unchanged.   ABDOMEN: No remarkable upper abdominal findings.   BONES: No acute osseous changes.       1.  Stable chest. See discussion above.       MACRO: None   Signed by: Joseph Schoenberger 3/26/2024 2:54 PM Dictation workstation:   [id number]        Radiology:     No orders to display       Physical Exam     General Appearance: alert and oriented to person, place and time, in no acute distress, very engaged at 89 very coherent.  Neck musculature is  somewhat tender.  Shoulder slightly tender.  Jaw is considerably tender to palpation though no obvious swelling.  Cardiovascular: normal rate, regular rhythm, normal S1 and S2, no murmurs, rubs, clicks, or gallops,  no JVD  Pulmonary/Chest: clear to auscultation bilaterally- no wheezes, rales or rhonchi, normal air movement, no respiratory distress  Abdomen: soft, non-tender, non-distended, normal bowel sounds, no masses   Extremities: no cyanosis, clubbing or edema  Skin: warm and dry, no rash or erythema  Eyes: EOMI  Neck: supple and non-tender without mass, no thyromegaly   Neurological: alert, oriented, normal speech, no focal findings or movement disorder noted

## 2024-05-08 NOTE — PATIENT INSTRUCTIONS
Dr. García is recommending you follow up with your dentist. You may need to have an x-ray of jaw.     Increase Carvedilol to 12.5 mg twice daily  Dr. García is ordering a lexiscan stress test (chemical stress test. No walking on treadmill)        -Please bring all medicines, vitamins, and herbal supplements with you in original bottles to every appointment!!!!    -Prescriptions will not be filled unless you are compliant with your follow up appointments or have a follow up appointment scheduled as per instruction of your physician. Refills should be requested at the time of your visit.

## 2024-05-20 ENCOUNTER — TELEPHONE (OUTPATIENT)
Dept: CARDIOLOGY | Facility: CLINIC | Age: 89
End: 2024-05-20
Payer: MEDICARE

## 2024-05-21 ENCOUNTER — APPOINTMENT (OUTPATIENT)
Dept: CARDIOLOGY | Facility: HOSPITAL | Age: 89
End: 2024-05-21
Payer: MEDICARE

## 2024-05-29 ENCOUNTER — HOSPITAL ENCOUNTER (OUTPATIENT)
Dept: RADIOLOGY | Facility: CLINIC | Age: 89
Discharge: HOME | End: 2024-05-29
Payer: MEDICARE

## 2024-05-29 ENCOUNTER — HOSPITAL ENCOUNTER (OUTPATIENT)
Dept: CARDIOLOGY | Facility: CLINIC | Age: 89
Discharge: HOME | End: 2024-05-29
Payer: MEDICARE

## 2024-05-29 DIAGNOSIS — R06.02 SOB (SHORTNESS OF BREATH): ICD-10-CM

## 2024-05-29 DIAGNOSIS — R06.02 SHORTNESS OF BREATH: ICD-10-CM

## 2024-05-29 DIAGNOSIS — R07.9 CHEST PAIN, UNSPECIFIED TYPE: ICD-10-CM

## 2024-05-29 DIAGNOSIS — R07.9 CHEST PAIN: Primary | ICD-10-CM

## 2024-05-29 PROCEDURE — A9502 TC99M TETROFOSMIN: HCPCS | Performed by: INTERNAL MEDICINE

## 2024-05-29 PROCEDURE — 2500000004 HC RX 250 GENERAL PHARMACY W/ HCPCS (ALT 636 FOR OP/ED): Performed by: INTERNAL MEDICINE

## 2024-05-29 PROCEDURE — 3430000001 HC RX 343 DIAGNOSTIC RADIOPHARMACEUTICALS: Performed by: INTERNAL MEDICINE

## 2024-05-29 PROCEDURE — 78452 HT MUSCLE IMAGE SPECT MULT: CPT

## 2024-05-29 PROCEDURE — 93017 CV STRESS TEST TRACING ONLY: CPT

## 2024-05-29 RX ORDER — REGADENOSON 0.08 MG/ML
0.4 INJECTION, SOLUTION INTRAVENOUS ONCE
Status: COMPLETED | OUTPATIENT
Start: 2024-05-29 | End: 2024-05-29

## 2024-05-29 RX ADMIN — REGADENOSON 0.4 MG: 0.08 INJECTION, SOLUTION INTRAVENOUS at 13:25

## 2024-05-29 RX ADMIN — TETROFOSMIN 10.4 MILLICURIE: 0.23 INJECTION, POWDER, LYOPHILIZED, FOR SOLUTION INTRAVENOUS at 12:20

## 2024-05-29 RX ADMIN — TETROFOSMIN 35.7 MILLICURIE: 0.23 INJECTION, POWDER, LYOPHILIZED, FOR SOLUTION INTRAVENOUS at 13:25

## 2024-06-04 ENCOUNTER — OFFICE VISIT (OUTPATIENT)
Dept: CARDIOLOGY | Facility: CLINIC | Age: 89
End: 2024-06-04
Payer: MEDICARE

## 2024-06-04 ENCOUNTER — APPOINTMENT (OUTPATIENT)
Dept: CARDIOLOGY | Facility: CLINIC | Age: 89
End: 2024-06-04
Payer: MEDICARE

## 2024-06-04 VITALS
DIASTOLIC BLOOD PRESSURE: 82 MMHG | WEIGHT: 158 LBS | HEART RATE: 70 BPM | HEIGHT: 65 IN | BODY MASS INDEX: 26.33 KG/M2 | SYSTOLIC BLOOD PRESSURE: 154 MMHG

## 2024-06-04 DIAGNOSIS — Z95.810 ICD (IMPLANTABLE CARDIOVERTER-DEFIBRILLATOR) IN PLACE: ICD-10-CM

## 2024-06-04 DIAGNOSIS — I10 PRIMARY HYPERTENSION: ICD-10-CM

## 2024-06-04 DIAGNOSIS — R09.89 LABILE HYPERTENSION: ICD-10-CM

## 2024-06-04 DIAGNOSIS — N18.31 STAGE 3A CHRONIC KIDNEY DISEASE (MULTI): ICD-10-CM

## 2024-06-04 DIAGNOSIS — I50.22 CHRONIC SYSTOLIC CONGESTIVE HEART FAILURE, NYHA CLASS 2 (MULTI): ICD-10-CM

## 2024-06-04 DIAGNOSIS — I25.5 ISCHEMIC CARDIOMYOPATHY: ICD-10-CM

## 2024-06-04 DIAGNOSIS — R07.9 CHEST PAIN, UNSPECIFIED TYPE: Primary | ICD-10-CM

## 2024-06-04 PROCEDURE — 1159F MED LIST DOCD IN RCRD: CPT | Performed by: NURSE PRACTITIONER

## 2024-06-04 PROCEDURE — 99214 OFFICE O/P EST MOD 30 MIN: CPT | Performed by: NURSE PRACTITIONER

## 2024-06-04 PROCEDURE — 3077F SYST BP >= 140 MM HG: CPT | Performed by: NURSE PRACTITIONER

## 2024-06-04 PROCEDURE — 3079F DIAST BP 80-89 MM HG: CPT | Performed by: NURSE PRACTITIONER

## 2024-06-04 PROCEDURE — 1157F ADVNC CARE PLAN IN RCRD: CPT | Performed by: NURSE PRACTITIONER

## 2024-06-04 PROCEDURE — 1036F TOBACCO NON-USER: CPT | Performed by: NURSE PRACTITIONER

## 2024-06-04 PROCEDURE — 1160F RVW MEDS BY RX/DR IN RCRD: CPT | Performed by: NURSE PRACTITIONER

## 2024-06-04 RX ORDER — SIMETHICONE 80 MG
80 TABLET,CHEWABLE ORAL EVERY 6 HOURS PRN
COMMUNITY
Start: 2024-05-28

## 2024-06-04 NOTE — PROGRESS NOTES
Oneil William is a 89 y.o. male that presents to the office today with his daughter for testing results.  He  follows with his  primary cardiologist Dr. Dillon  and was added to my schedule today in her absence.   PMH as noted below.    He was recently seen in office by Dr. García for chest pain for which a nuclear lexiscan stress test ws ordered. Results reviewed and discussed as noted below.  He states that his chest pain has resolved but he has been experiencing abdominal pain and did not feel like eating today. Also reports only having small bowel movements with help of suppository.  Was seen at [facility] ER 5/28/24 for abdominal pain. Abd x-ray showed nonobstructive gas pattern.        PMH  1. Congestive heart failure functional class II/III  2.. Cardiac catheterization 11/2018 LVEDP 10 mmHg LVEF 40% left main 0% stenosis LAD 20% within previous stent in the distal vessel, mid LAD stent 20% stenosis, left circumflex 30% stenosis RCA less than 20% proximal and distal stenosis.  3. Has tendency for hyperkalemia, have to watch closely. Hyperkalemia was the reason for discontinuation of Entresto  4. Chronic kidney disease stage III  5. Tendency for orthostatic hypotension. Not orthostatic today, blood pressure is at target, patient feels well.  6. Degenerative joint disease, ambulates with a wheeled walker.  7. Increased frequency of urination, partly related to BPH  8. Echocardiogram March 3, 2021 LVEF 40 to 45%, normal RV size and function, trivial mitral regurgitation 1+ tricuspid regurgitation RVSP 30 mmHg trivial tricuspid regurgitation  9. Biventricular ICD, device interrogation April 2021 showed less than 1% atrial fibrillation burden longest duration 21 hours, this is addressed by EP service  10. Elevated D-dimer 0.7 6 June 2021-CT chest negative for pulmonary embolism  11. Status post injection right rotator cuff for pain.  12. Carotid ultrasound 2018 less than 50% bilateral carotid stenosis  12. Lexiscan Myoview  March 2021 no ischemia LVEF 39%  13. chronically elevated troponin  14. seizure disorder   15. Orthostatic hypotension  16. ACE inhibitor/angiotensin receptor blocker/Entresto intolerance due to hyperkalemia  17. BPH  18. Frequent ER visits, for multitude of reasons, lately for accelerated hypertension, chronic dizziness, and now with complaints of chest pain and jaw discomfort. Did not try nitroglycerin but took Tylenol. Very difficult to assess clinically.  19. Agree with the fact that anxiety and/or depression are triggering several of the ER visits.  20. Lexiscan Myoview October 2022-no evidence of ischemia or infarction, LVEF 54%, LVEF was reported to be 39% in March 2021 transient ischemic dilatation 1.0 which is normal.   21.  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  CKD stage IIIb  23.  Echocardiogram March 2021-LVEF 40 to 45%, 1+ tricuspid regurgitation, RVSP 30 mmHg, trivial pulmonary regurgitation, minimal aortic leaflet calcification no evidence of aortic regurgitation peak and mean gradients 8 and 5 respectively  24.  Echocardiogram 12/15/2023-Limited study calcified aortic valve cusps stenotic cyst not assessed due to limited exam, LVEF 60 to 65% reduced RV systolic function mitral annular calcification, mildly dilated left atrium is a limited echo and so aortic valve was not assessed.  Evidence of diastolic dysfunction.      Testing Reviewed  5/29/2024 Nuclear stress   Normal [employer] cardiac perfusion stress test.  No evidence of ischemia or myocardial infarction by perfusion imaging.  Normal left ventricular systolic function, ejection fraction 61%.    Assessment/Plan  Chest pain: Denies at this time  Hypertension:  Blood pressure slightly elevated in office today, he states that he has not taken his afternoon dose  of hydralazine.  Abdominal pain:  Defer to PCP, may need GI workup. Has pending apt with Dr. Alford soon.   Follow with Dr. Dillon as scheduled or sooner if needed.       Patient Active Problem List   Diagnosis    DDD (degenerative disc disease), cervical    Anxiety    Atrial fibrillation (Multi)    Benign enlargement of prostate    Bilateral carotid artery disease (CMS-HCC)    Cardiomyopathy (Multi)    Chest pain    Chronic kidney disease, stage 2 (mild)    Chronic right shoulder pain    GERD (gastroesophageal reflux disease)    ICD (implantable cardioverter-defibrillator) in place    Labile hypertension    Moderate dementia (Multi)    Peripheral neuropathy    Seizure disorder (Multi)    Stage 3a chronic kidney disease (Multi)    Systolic congestive heart failure, NYHA class 2 (Multi)    Vitamin B12 deficiency    Vitamin D deficiency disease    At risk for fall due to comorbid condition    Body mass index (BMI) of 24.0 to 24.9 in adult    CAD (coronary artery disease)    Hypertension    Insomnia    Iron deficiency    Psychosomatic disorder    Combined form of senile cataract    Epiretinal membrane    Hospital discharge follow-up    Benign hypertensive kidney disease with chronic kidney disease stage I through stage IV, or unspecified(403.10)    Aortic systolic murmur on examination       Social History     Tobacco Use    Smoking status: Never    Smokeless tobacco: Never   Vaping Use    Vaping status: Never Used   Substance Use Topics    Alcohol use: Not Currently    Drug use: Not Currently       Past Medical History:   Diagnosis Date    Encounter for follow-up examination after completed treatment for conditions other than malignant neoplasm 07/11/2022    Hospital discharge follow-up    Encounter for follow-up examination after completed treatment for conditions other than malignant neoplasm 05/04/2022    Hospital discharge follow-up    Hypertension     ICD (implantable cardioverter-defibrillator) in place      Insomnia disorder     Personal history of other diseases of the circulatory system 12/07/2022    History of hypertension    Personal history of other endocrine, nutritional and metabolic disease 12/07/2022    History of hyperlipidemia         Current Outpatient Medications:     aspirin 81 mg EC tablet, Take 1 tablet (81 mg) by mouth once daily., Disp: 90 tablet, Rfl: 3    atorvastatin (Lipitor) 80 mg tablet, Take 1 tablet (80 mg) by mouth once daily at bedtime., Disp: 90 tablet, Rfl: 3    amilcar oil-levomenthol (FDgard) 25-20.75 mg capsule, Take 1 capsule by mouth once daily in the morning. may take additonal tablet in the evening if needed, Disp: , Rfl:     carvedilol (Coreg) 12.5 mg tablet, Take 1 tablet (12.5 mg) by mouth 2 times a day with meals., Disp: 180 tablet, Rfl: 3    cholecalciferol (Vitamin D-3) 1,250 mcg (50,000 unit) capsule, Take 1 capsule (50,000 Units) by mouth 1 (one) time per week., Disp: , Rfl:     digoxin (Lanoxin) 125 MCG tablet, Take 1 tablet (125 mcg) by mouth once daily., Disp: 90 tablet, Rfl: 3    divalproex (Depakote) 500 mg EC tablet, Take 1 tablet (500 mg) by mouth 3 times a day. AFTER MEALS., Disp: , Rfl:     docusate sodium (Colace) 100 mg capsule, Take 1 capsule (100 mg) by mouth once daily as needed., Disp: , Rfl:     folic acid (Folvite) 1 mg tablet, Take 1 tablet (1 mg) by mouth once daily., Disp: , Rfl:     furosemide (Lasix) 20 mg tablet, Take 1 tablet (20 mg) by mouth every other day., Disp: 45 tablet, Rfl: 3    hydrALAZINE (Apresoline) 50 mg tablet, Take ( 1) 50 mg tablet twice day one in AM and one at bedtime,  Take (2) 50 mg tablets in afternoon to total 100 mg, Disp: 270 tablet, Rfl: 3    meclizine (Antivert) 25 mg tablet, Take 1 tablet (25 mg) by mouth 3 times a day as needed for dizziness., Disp: 30 tablet, Rfl: 0    multivit-min/ferrous fumarate (MULTI VITAMIN ORAL), Take 1 tablet by mouth once daily., Disp: , Rfl:     nitroglycerin (Nitrostat) 0.4 mg SL tablet, Place 1  tablet (0.4 mg) under the tongue every 5 minutes if needed for chest pain., Disp: 90 tablet, Rfl: 3    ondansetron (Zofran) 4 mg tablet, Take 1 tablet (4 mg) by mouth 3 times a day as needed for nausea., Disp: , Rfl:     pantoprazole (ProtoNix) 40 mg EC tablet, Take 1 tablet (40 mg) by mouth once daily., Disp: 90 tablet, Rfl: 1    simethicone (Mylicon) 80 mg chewable tablet, Chew 1 tablet (80 mg) every 6 hours if needed for flatulence., Disp: , Rfl:     mirtazapine (Remeron) 15 mg tablet, Take 1 tablet (15 mg) by mouth once daily at bedtime., Disp: , Rfl:     pantoprazole (ProtoNix) 20 mg EC tablet, Take 2 tablets (40 mg) by mouth once daily for 7 days. Do not crush, chew, or split., Disp: 14 tablet, Rfl: 0    Codeine, Lisinopril, and Penicillins    Family History   Problem Relation Name Age of Onset    Cancer Father      Other (cardiac disorder) Sister         Past Surgical History:   Procedure Laterality Date    CT ANGIO NECK  3/10/2023    CT NECK ANGIO W AND WO IV CONTRAST 3/10/2023    CT HEAD ANGIO W AND WO IV CONTRAST  3/10/2023    CT HEAD ANGIO W AND WO IV CONTRAST 3/10/2023    OTHER SURGICAL HISTORY  11/10/2021    Surgery    OTHER SURGICAL HISTORY  11/10/2021    Cataract surgery    OTHER SURGICAL HISTORY  11/10/2021    Inguinal hernia repair    OTHER SURGICAL HISTORY  11/10/2021    Pacemaker insertion    OTHER SURGICAL HISTORY  11/10/2021    Esophagogastroduodenoscopy    OTHER SURGICAL HISTORY  05/04/2022    Prostate surgery    OTHER SURGICAL HISTORY  08/30/2022    Pancreatectomy    OTHER SURGICAL HISTORY  08/30/2022    Prostatectomy    OTHER SURGICAL HISTORY  03/29/2022    Pancreatic surgery    OTHER SURGICAL HISTORY  03/03/2022    Appendectomy          Review of systems  Constitutional: No weight loss, fever, chills, weakness or fatigue  HEENT: No visual loss, blurred vision, double vision or yellow sclerae  Skin: No rash or itching  Cardiovascular: No chest pain, pressure or discomfort, No palpitations or  "edema.  Respiratory: No shortness of breath, cough or sputum  Gastrointestinal: Positive for abd discomfort and constipitation  Neurological: No headache, lightheadedness, dizziness, syncope.   Musculoskeletal: No muscle, back pain, joint pain or stiffness.  Hematologic: No anemia, bleeding or bruising.    /82 (BP Location: Left arm, Patient Position: Sitting)   Pulse 70   Ht 1.651 m (5' 5\")   Wt 71.7 kg (158 lb)   BMI 26.29 kg/m²     Patient Active Problem List   Diagnosis    DDD (degenerative disc disease), cervical    Anxiety    Atrial fibrillation (Multi)    Benign enlargement of prostate    Bilateral carotid artery disease (CMS-HCC)    Cardiomyopathy (Multi)    Chest pain    Chronic kidney disease, stage 2 (mild)    Chronic right shoulder pain    GERD (gastroesophageal reflux disease)    ICD (implantable cardioverter-defibrillator) in place    Labile hypertension    Moderate dementia (Multi)    Peripheral neuropathy    Seizure disorder (Multi)    Stage 3a chronic kidney disease (Multi)    Systolic congestive heart failure, NYHA class 2 (Multi)    Vitamin B12 deficiency    Vitamin D deficiency disease    At risk for fall due to comorbid condition    Body mass index (BMI) of 24.0 to 24.9 in adult    CAD (coronary artery disease)    Hypertension    Insomnia    Iron deficiency    Psychosomatic disorder    Combined form of senile cataract    Epiretinal membrane    Hospital discharge follow-up    Benign hypertensive kidney disease with chronic kidney disease stage I through stage IV, or unspecified(403.10)    Aortic systolic murmur on examination         Physical Exam  Constitutional: Well developed, awake/alert x 3, no distress.  Head/Neck: No JVD, No bruits  Respiratory/Thorax: patent airways, CTAB, normal breath sounds with good expansion.  Cardiovascular: Regular rate and rhythm, no murmurs, normal S1 and S2,   Gastrointestinal: Non distended, soft, non-tender, no rebound tenderness or " guarding.  Extremities: No cyanosis, edema.   Neurological: Alert and oriented x 3. Moves extremities spontaneous with purpose.  Psychological: Appropriate mood and behavior  Skin: Warm and Dry. No lesions or rashes.         Please excuse any errors in grammar or translation related to dictation, voice recognition software was used to prepare this document.

## 2024-06-04 NOTE — PATIENT INSTRUCTIONS
Please try to increase your water intake, you should try to drink 6 - 8 glasses/bottles of water per day to stay well hydrated.      Try drinking prune juice daily for constipation.

## 2024-06-05 ENCOUNTER — APPOINTMENT (OUTPATIENT)
Dept: PRIMARY CARE | Facility: CLINIC | Age: 89
End: 2024-06-05
Payer: MEDICARE

## 2024-06-07 ENCOUNTER — APPOINTMENT (OUTPATIENT)
Dept: CARDIOLOGY | Facility: CLINIC | Age: 89
End: 2024-06-07
Payer: MEDICARE

## 2024-06-07 ENCOUNTER — APPOINTMENT (OUTPATIENT)
Dept: RADIOLOGY | Facility: CLINIC | Age: 89
End: 2024-06-07
Payer: MEDICARE

## 2024-06-11 ENCOUNTER — OFFICE VISIT (OUTPATIENT)
Dept: PRIMARY CARE | Facility: CLINIC | Age: 89
End: 2024-06-11
Payer: MEDICARE

## 2024-06-11 VITALS
BODY MASS INDEX: 24.98 KG/M2 | TEMPERATURE: 98.2 F | HEIGHT: 66 IN | HEART RATE: 70 BPM | WEIGHT: 155.4 LBS | DIASTOLIC BLOOD PRESSURE: 66 MMHG | SYSTOLIC BLOOD PRESSURE: 128 MMHG | OXYGEN SATURATION: 97 %

## 2024-06-11 DIAGNOSIS — I65.23 BILATERAL CAROTID ARTERY OCCLUSION: ICD-10-CM

## 2024-06-11 DIAGNOSIS — F03.B0 MODERATE DEMENTIA WITHOUT BEHAVIORAL DISTURBANCE, PSYCHOTIC DISTURBANCE, MOOD DISTURBANCE, OR ANXIETY, UNSPECIFIED DEMENTIA TYPE (MULTI): ICD-10-CM

## 2024-06-11 DIAGNOSIS — J81.0 ACUTE PULMONARY EDEMA (MULTI): ICD-10-CM

## 2024-06-11 DIAGNOSIS — K59.01 SLOW TRANSIT CONSTIPATION: Primary | ICD-10-CM

## 2024-06-11 DIAGNOSIS — N18.4 CHRONIC RENAL DISEASE, STAGE IV (MULTI): ICD-10-CM

## 2024-06-11 DIAGNOSIS — K21.00 GASTROESOPHAGEAL REFLUX DISEASE WITH ESOPHAGITIS, UNSPECIFIED WHETHER HEMORRHAGE: ICD-10-CM

## 2024-06-11 PROCEDURE — 1159F MED LIST DOCD IN RCRD: CPT | Performed by: INTERNAL MEDICINE

## 2024-06-11 PROCEDURE — 3074F SYST BP LT 130 MM HG: CPT | Performed by: INTERNAL MEDICINE

## 2024-06-11 PROCEDURE — 99214 OFFICE O/P EST MOD 30 MIN: CPT | Performed by: INTERNAL MEDICINE

## 2024-06-11 PROCEDURE — 3078F DIAST BP <80 MM HG: CPT | Performed by: INTERNAL MEDICINE

## 2024-06-11 PROCEDURE — 1124F ACP DISCUSS-NO DSCNMKR DOCD: CPT | Performed by: INTERNAL MEDICINE

## 2024-06-11 PROCEDURE — 1157F ADVNC CARE PLAN IN RCRD: CPT | Performed by: INTERNAL MEDICINE

## 2024-06-11 RX ORDER — DOCUSATE SODIUM 100 MG/1
100 CAPSULE, LIQUID FILLED ORAL DAILY PRN
Qty: 30 CAPSULE | Refills: 3 | Status: SHIPPED | OUTPATIENT
Start: 2024-06-11 | End: 2025-06-11

## 2024-06-11 ASSESSMENT — PATIENT HEALTH QUESTIONNAIRE - PHQ9
1. LITTLE INTEREST OR PLEASURE IN DOING THINGS: NOT AT ALL
2. FEELING DOWN, DEPRESSED OR HOPELESS: NOT AT ALL
SUM OF ALL RESPONSES TO PHQ9 QUESTIONS 1 AND 2: 0

## 2024-06-11 ASSESSMENT — ENCOUNTER SYMPTOMS
DEPRESSION: 1
LOSS OF SENSATION IN FEET: 1

## 2024-07-01 ASSESSMENT — ENCOUNTER SYMPTOMS
COUGH: 0
MYALGIAS: 0
ACTIVITY CHANGE: 0
DYSURIA: 0
SORE THROAT: 0
LIGHT-HEADEDNESS: 0
ABDOMINAL PAIN: 0

## 2024-07-01 NOTE — PROGRESS NOTES
"CHIEF COMPLAINT:    Chief Complaint   Patient presents with    [facility] 6-9-2024     6 month follow-up        HISTORY OF PRESENT ILLNESS:  JUAN A William  is a pleasant 89 y.o. male Here for 6-month follow-up.  He has been to the emergency department multiple times for chest pain and pressure.  These are all anxiety induced multiple workup has been done.  Overall patient is doing well.  He does not have any acute medical complaint at this time.  He complains about abdominal pain and constipation.  We discussed about increase roughage, fluid and fiber.        Review of Systems   Constitutional:  Negative for activity change.   HENT:  Negative for congestion and sore throat.    Respiratory:  Negative for cough.    Cardiovascular:  Negative for chest pain.   Gastrointestinal:  Negative for abdominal pain.   Endocrine: Negative for polyuria.   Genitourinary:  Negative for dysuria.   Musculoskeletal:  Negative for myalgias.   Skin:  Negative for rash.   Neurological:  Negative for light-headedness.   Psychiatric/Behavioral:  Negative for behavioral problems.      Visit Vitals  /66 (BP Location: Left arm, Patient Position: Sitting)   Pulse 70   Temp 36.8 °C (98.2 °F)   Ht 1.676 m (5' 6\")   Wt 70.5 kg (155 lb 6.4 oz)   SpO2 97% Comment: RA   BMI 25.08 kg/m²   Smoking Status Never   BSA 1.81 m²         Wt Readings from Last 10 Encounters:   06/11/24 70.5 kg (155 lb 6.4 oz)   06/04/24 71.7 kg (158 lb)   05/08/24 71.5 kg (157 lb 9.6 oz)   03/26/24 70.8 kg (156 lb)   03/26/24 70.3 kg (155 lb)   02/21/24 68.5 kg (151 lb)   02/13/24 66.2 kg (146 lb)   12/28/23 66.5 kg (146 lb 9.6 oz)   12/05/23 67.1 kg (148 lb)   11/24/23 68.5 kg (151 lb)       Physical Exam  Vitals and nursing note reviewed.   Constitutional:       Appearance: Normal appearance.   HENT:      Head: Normocephalic.      Right Ear: Tympanic membrane normal.      Left Ear: Tympanic membrane normal.      Nose: Nose normal.      Mouth/Throat:      Mouth: Mucous " membranes are moist.   Cardiovascular:      Rate and Rhythm: Normal rate and regular rhythm.      Pulses: Normal pulses.      Heart sounds: No murmur heard.  Pulmonary:      Effort: No respiratory distress.      Breath sounds: Normal breath sounds.   Abdominal:      Palpations: Abdomen is soft.   Musculoskeletal:      Cervical back: Neck supple.      Right lower leg: No edema.      Left lower leg: No edema.   Skin:     General: Skin is warm.      Findings: No rash.   Neurological:      General: No focal deficit present.      Mental Status: He is alert and oriented to person, place, and time.   Psychiatric:         Mood and Affect: Mood normal.          RECENT LABS:  Lab Results   Component Value Date    WBC 6.1 03/26/2024    HGB 10.6 (L) 03/26/2024    HCT 31.8 (L) 03/26/2024     03/26/2024    ALT 15 03/26/2024    AST 31 03/26/2024     03/26/2024    K 4.3 03/26/2024     03/26/2024    CREATININE 2.06 (H) 03/26/2024    BUN 27 (H) 03/26/2024    CO2 26 03/26/2024    INR 1.1 03/26/2024       IMAGING:  === 03/26/24 ===    XR CHEST 1 VIEW    - Impression -  1.  Stable chest. See discussion above.        MACRO:  None    Signed by: Joseph Schoenberger 3/26/2024 2:54 PM  Dictation workstation:   [id number]   === 03/26/24 ===    CT ABDOMEN PELVIS WO IV CONTRAST    - Impression -  1.  Atrophic left kidney  2. No acute finding to explain the patient's symptoms      MACRO:  None    Signed by: Dalila Silva 3/26/2024 4:48 PM  Dictation workstation:   [id number]     MEDICATIONS:   Current Outpatient Medications   Medication Instructions    aspirin 81 mg, oral, Daily    atorvastatin (LIPITOR) 80 mg, oral, Nightly    amilcar oil-levomenthol (FDgard) 25-20.75 mg capsule 1 capsule, oral, Every morning, may take additonal tablet in the evening if needed<BR>    carvedilol (COREG) 12.5 mg, oral, 2 times daily (morning and late afternoon)    cholecalciferol (Vitamin D-3) 1,250 mcg (50,000 unit) capsule 1 capsule,  oral, Once Weekly    digoxin (LANOXIN) 125 mcg, oral, Daily    divalproex (DEPAKOTE) 500 mg, oral, 3 times daily, AFTER MEALS.    docusate sodium (COLACE) 100 mg, oral, Daily PRN    folic acid (Folvite) 1 mg tablet 0.5 tablets, oral, Daily    furosemide (LASIX) 20 mg, oral, Every other day    hydrALAZINE (Apresoline) 50 mg tablet Take ( 1) 50 mg tablet twice day one in AM and one at bedtime,  Take (2) 50 mg tablets in afternoon to total 100 mg    meclizine (ANTIVERT) 25 mg, oral, 3 times daily PRN    mirtazapine (REMERON) 15 mg, oral, Nightly    multivit-min/ferrous fumarate (MULTI VITAMIN ORAL) 1 tablet, oral, Daily    nitroglycerin (NITROSTAT) 0.4 mg, sublingual, Every 5 min PRN    ondansetron (ZOFRAN) 4 mg, oral, 3 times daily PRN    pantoprazole (PROTONIX) 40 mg, oral, Daily    simethicone (MYLICON) 80 mg, oral, Every 6 hours PRN        TODAY'S VISIT  DX:   1. Slow transit constipation  docusate sodium (Colace) 100 mg capsule      2. Gastroesophageal reflux disease with esophagitis, unspecified whether hemorrhage  On PPI      3. Chronic renal disease, stage IV (Multi)  Stable      4. Acute pulmonary edema (Multi)  Takes furosemide as needed      5. Moderate dementia without behavioral disturbance, psychotic disturbance, mood disturbance, or anxiety, unspecified dementia type (Multi)  Stable, not aggressive, redirectable      6. Bilateral carotid artery occlusion  Continue aspirin, Lipitor.           MEDICAL DECISION MAKING:  - The current and active medical co morbidities have been considered.   - Recent lab work and relevant imaging studies have been reviewed.    - Relevant correspondence/notes from other specialty consultants were reviewed.    - Medication have been sent for refill.    - Next Follow up in 3 months  - Patient was given treatment as per above plan

## 2024-07-18 ENCOUNTER — APPOINTMENT (OUTPATIENT)
Dept: PRIMARY CARE | Facility: CLINIC | Age: 89
End: 2024-07-18
Payer: MEDICARE

## 2024-08-06 ENCOUNTER — APPOINTMENT (OUTPATIENT)
Dept: PRIMARY CARE | Facility: CLINIC | Age: 89
End: 2024-08-06
Payer: MEDICARE

## 2024-08-06 VITALS
HEIGHT: 66 IN | DIASTOLIC BLOOD PRESSURE: 76 MMHG | HEART RATE: 70 BPM | OXYGEN SATURATION: 99 % | WEIGHT: 149 LBS | BODY MASS INDEX: 23.95 KG/M2 | SYSTOLIC BLOOD PRESSURE: 140 MMHG | TEMPERATURE: 98 F

## 2024-08-06 DIAGNOSIS — Z95.810 ICD (IMPLANTABLE CARDIOVERTER-DEFIBRILLATOR) IN PLACE: ICD-10-CM

## 2024-08-06 DIAGNOSIS — K21.9 GASTROESOPHAGEAL REFLUX DISEASE, UNSPECIFIED WHETHER ESOPHAGITIS PRESENT: ICD-10-CM

## 2024-08-06 DIAGNOSIS — N18.31 STAGE 3A CHRONIC KIDNEY DISEASE (MULTI): ICD-10-CM

## 2024-08-06 DIAGNOSIS — R09.89 LABILE HYPERTENSION: ICD-10-CM

## 2024-08-06 DIAGNOSIS — I25.5 ISCHEMIC CARDIOMYOPATHY: ICD-10-CM

## 2024-08-06 DIAGNOSIS — I48.0 PAROXYSMAL ATRIAL FIBRILLATION (MULTI): ICD-10-CM

## 2024-08-06 DIAGNOSIS — I50.22 CHRONIC SYSTOLIC CONGESTIVE HEART FAILURE, NYHA CLASS 2 (MULTI): ICD-10-CM

## 2024-08-06 DIAGNOSIS — I10 PRIMARY HYPERTENSION: ICD-10-CM

## 2024-08-06 DIAGNOSIS — Z76.0 MEDICATION REFILL: Primary | ICD-10-CM

## 2024-08-06 DIAGNOSIS — G40.909 SEIZURE DISORDER (MULTI): ICD-10-CM

## 2024-08-06 PROCEDURE — 1159F MED LIST DOCD IN RCRD: CPT

## 2024-08-06 PROCEDURE — 3078F DIAST BP <80 MM HG: CPT

## 2024-08-06 PROCEDURE — 3077F SYST BP >= 140 MM HG: CPT

## 2024-08-06 PROCEDURE — 1036F TOBACCO NON-USER: CPT

## 2024-08-06 PROCEDURE — 1123F ACP DISCUSS/DSCN MKR DOCD: CPT

## 2024-08-06 PROCEDURE — 1158F ADVNC CARE PLAN TLK DOCD: CPT

## 2024-08-06 PROCEDURE — 1157F ADVNC CARE PLAN IN RCRD: CPT

## 2024-08-06 PROCEDURE — 99213 OFFICE O/P EST LOW 20 MIN: CPT

## 2024-08-06 RX ORDER — ASPIRIN 81 MG/1
81 TABLET ORAL DAILY
Qty: 90 TABLET | Refills: 3 | Status: SHIPPED | OUTPATIENT
Start: 2024-08-06 | End: 2025-08-06

## 2024-08-06 RX ORDER — HYDRALAZINE HYDROCHLORIDE 50 MG/1
TABLET, FILM COATED ORAL
Qty: 270 TABLET | Refills: 3 | Status: SHIPPED | OUTPATIENT
Start: 2024-08-06

## 2024-08-06 RX ORDER — NITROGLYCERIN 0.4 MG/1
0.4 TABLET SUBLINGUAL EVERY 5 MIN PRN
Qty: 90 TABLET | Refills: 3 | Status: SHIPPED | OUTPATIENT
Start: 2024-08-06 | End: 2025-08-06

## 2024-08-06 RX ORDER — DIVALPROEX SODIUM 500 MG/1
500 TABLET, DELAYED RELEASE ORAL 3 TIMES DAILY
Qty: 90 TABLET | Refills: 0 | Status: SHIPPED | OUTPATIENT
Start: 2024-08-06 | End: 2024-09-05

## 2024-08-06 RX ORDER — CARVEDILOL 12.5 MG/1
12.5 TABLET ORAL
Qty: 180 TABLET | Refills: 3 | Status: SHIPPED | OUTPATIENT
Start: 2024-08-06 | End: 2025-08-06

## 2024-08-06 RX ORDER — ATORVASTATIN CALCIUM 80 MG/1
80 TABLET, FILM COATED ORAL NIGHTLY
Qty: 90 TABLET | Refills: 3 | Status: SHIPPED | OUTPATIENT
Start: 2024-08-06 | End: 2025-08-06

## 2024-08-06 RX ORDER — PANTOPRAZOLE SODIUM 40 MG/1
40 TABLET, DELAYED RELEASE ORAL DAILY
Qty: 90 TABLET | Refills: 1 | Status: SHIPPED | OUTPATIENT
Start: 2024-08-06

## 2024-08-06 RX ORDER — FOLIC ACID 1 MG/1
0.5 TABLET ORAL DAILY
Qty: 15 TABLET | Refills: 0 | Status: SHIPPED | OUTPATIENT
Start: 2024-08-06 | End: 2024-09-05

## 2024-08-06 RX ORDER — MECLIZINE HYDROCHLORIDE 25 MG/1
25 TABLET ORAL 3 TIMES DAILY PRN
Qty: 30 TABLET | Refills: 0 | Status: SHIPPED | OUTPATIENT
Start: 2024-08-06

## 2024-08-06 RX ORDER — DIGOXIN 125 MCG
125 TABLET ORAL DAILY
Qty: 90 TABLET | Refills: 3 | Status: SHIPPED | OUTPATIENT
Start: 2024-08-06 | End: 2025-08-06

## 2024-08-06 ASSESSMENT — ENCOUNTER SYMPTOMS
COUGH: 0
DIZZINESS: 1
ABDOMINAL PAIN: 0
APNEA: 0
FLANK PAIN: 0
ACTIVITY CHANGE: 0
ABDOMINAL DISTENTION: 0
JOINT SWELLING: 0
DYSURIA: 0
CHEST TIGHTNESS: 0
CHILLS: 0
APPETITE CHANGE: 0
ARTHRALGIAS: 0
CHOKING: 0

## 2024-08-06 NOTE — PROGRESS NOTES
"Subjective   Patient ID: JUAN A William is a 89 y.o. male who presents for ER Follow-up (Patient is in office today for ER follow-up Chest pain [facility] ) and Med Management (Medication review and refill).    HPI      A pleasant 89 yr old . male with PMH of Afib, CHF, s/p AICD placement, CKD, seizure, Vertigo, BPH, CAD, HLD, mild cognitive impairment, recurrent ED visits for chest pain/constipation, dizziness. Presented today for medication refill.  He states that he feels well today, denies any chest pain, nausea or dizziness today.   He lives alone, his neighbor across the street helps him, with driving, shopping. he doesn't drive anymore, he lives in a flat, ambulates with a cane.      Review of Systems   Constitutional:  Negative for activity change, appetite change and chills.   HENT:  Negative for congestion, drooling and ear discharge.    Respiratory:  Negative for apnea, cough, choking and chest tightness.    Cardiovascular:  Positive for chest pain. Negative for leg swelling.   Gastrointestinal:  Negative for abdominal distention and abdominal pain.   Genitourinary:  Negative for dysuria and flank pain.   Musculoskeletal:  Positive for gait problem. Negative for arthralgias and joint swelling.   Neurological:  Positive for dizziness.       Objective   /76 (BP Location: Left arm, Patient Position: Sitting, BP Cuff Size: Adult)   Pulse 70   Temp 36.7 °C (98 °F) (Temporal)   Ht 1.676 m (5' 6\")   Wt 67.6 kg (149 lb)   SpO2 99% Comment: RA  BMI 24.05 kg/m²     Physical Exam  Constitutional:       General: He is not in acute distress.     Appearance: Normal appearance. He is not ill-appearing, toxic-appearing or diaphoretic.   HENT:      Head: Normocephalic and atraumatic.   Cardiovascular:      Rate and Rhythm: Normal rate and regular rhythm.      Heart sounds: No murmur heard.     No gallop.   Pulmonary:      Effort: Pulmonary effort is normal. No respiratory distress.      Breath sounds: Normal breath " sounds. No stridor. No wheezing.   Abdominal:      General: Abdomen is flat. There is no distension.      Palpations: Abdomen is soft.      Tenderness: There is no abdominal tenderness.   Musculoskeletal:         General: Normal range of motion.      Cervical back: Neck supple.   Skin:     General: Skin is warm.   Neurological:      General: No focal deficit present.      Mental Status: He is alert.   Psychiatric:         Mood and Affect: Mood normal.         Behavior: Behavior normal.         Thought Content: Thought content normal.         Judgment: Judgment normal.         Assessment/Plan   Problem List Items Addressed This Visit          Cardiac and Vasculature    Atrial fibrillation (Multi)    Relevant Medications    carvedilol (Coreg) 12.5 mg tablet    digoxin (Lanoxin) 125 MCG tablet    nitroglycerin (Nitrostat) 0.4 mg SL tablet    Cardiomyopathy (Multi)    Relevant Medications    aspirin 81 mg EC tablet    atorvastatin (Lipitor) 80 mg tablet    carvedilol (Coreg) 12.5 mg tablet    digoxin (Lanoxin) 125 MCG tablet    hydrALAZINE (Apresoline) 50 mg tablet    nitroglycerin (Nitrostat) 0.4 mg SL tablet    ICD (implantable cardioverter-defibrillator) in place    Relevant Medications    aspirin 81 mg EC tablet    atorvastatin (Lipitor) 80 mg tablet    hydrALAZINE (Apresoline) 50 mg tablet    nitroglycerin (Nitrostat) 0.4 mg SL tablet    Labile hypertension    Relevant Medications    aspirin 81 mg EC tablet    atorvastatin (Lipitor) 80 mg tablet    hydrALAZINE (Apresoline) 50 mg tablet    nitroglycerin (Nitrostat) 0.4 mg SL tablet    Systolic congestive heart failure, NYHA class 2 (Multi)    Relevant Medications    aspirin 81 mg EC tablet    atorvastatin (Lipitor) 80 mg tablet    carvedilol (Coreg) 12.5 mg tablet    digoxin (Lanoxin) 125 MCG tablet    hydrALAZINE (Apresoline) 50 mg tablet    nitroglycerin (Nitrostat) 0.4 mg SL tablet    Hypertension    Relevant Medications    aspirin 81 mg EC tablet    atorvastatin  (Lipitor) 80 mg tablet    carvedilol (Coreg) 12.5 mg tablet    hydrALAZINE (Apresoline) 50 mg tablet    nitroglycerin (Nitrostat) 0.4 mg SL tablet       Gastrointestinal and Abdominal    GERD (gastroesophageal reflux disease)    Relevant Medications    pantoprazole (ProtoNix) 40 mg EC tablet       Genitourinary and Reproductive    Stage 3a chronic kidney disease (Multi)    Relevant Medications    aspirin 81 mg EC tablet    atorvastatin (Lipitor) 80 mg tablet    hydrALAZINE (Apresoline) 50 mg tablet    nitroglycerin (Nitrostat) 0.4 mg SL tablet       Neuro    Seizure disorder (Multi)    Relevant Medications    meclizine (Antivert) 25 mg tablet    divalproex (Depakote) 500 mg EC tablet    folic acid (Folvite) 1 mg tablet     Other Visit Diagnoses       Medication refill    -  Primary          -Follow-up in 4 months

## 2024-08-21 ENCOUNTER — APPOINTMENT (OUTPATIENT)
Dept: CARDIOLOGY | Facility: CLINIC | Age: 89
End: 2024-08-21
Payer: MEDICARE

## 2024-09-04 ENCOUNTER — APPOINTMENT (OUTPATIENT)
Dept: CARDIOLOGY | Facility: CLINIC | Age: 89
End: 2024-09-04
Payer: MEDICARE

## 2024-09-12 ENCOUNTER — APPOINTMENT (OUTPATIENT)
Dept: CARDIOLOGY | Facility: CLINIC | Age: 89
End: 2024-09-12
Payer: MEDICARE

## 2024-09-25 ENCOUNTER — APPOINTMENT (OUTPATIENT)
Dept: CARDIOLOGY | Facility: CLINIC | Age: 89
End: 2024-09-25
Payer: MEDICARE

## 2024-09-25 VITALS
HEIGHT: 65 IN | BODY MASS INDEX: 24.59 KG/M2 | SYSTOLIC BLOOD PRESSURE: 100 MMHG | WEIGHT: 147.6 LBS | DIASTOLIC BLOOD PRESSURE: 56 MMHG | HEART RATE: 70 BPM

## 2024-09-25 DIAGNOSIS — N18.31 STAGE 3A CHRONIC KIDNEY DISEASE (MULTI): ICD-10-CM

## 2024-09-25 DIAGNOSIS — Z53.09 ACEI/ARB CONTRAINDICATED: ICD-10-CM

## 2024-09-25 DIAGNOSIS — I25.118 CORONARY ARTERY DISEASE OF NATIVE ARTERY OF NATIVE HEART WITH STABLE ANGINA PECTORIS: ICD-10-CM

## 2024-09-25 DIAGNOSIS — Z95.810 ICD (IMPLANTABLE CARDIOVERTER-DEFIBRILLATOR) IN PLACE: ICD-10-CM

## 2024-09-25 DIAGNOSIS — I50.22 CHRONIC SYSTOLIC CONGESTIVE HEART FAILURE, NYHA CLASS 2: ICD-10-CM

## 2024-09-25 PROCEDURE — 1160F RVW MEDS BY RX/DR IN RCRD: CPT | Performed by: INTERNAL MEDICINE

## 2024-09-25 PROCEDURE — 1036F TOBACCO NON-USER: CPT | Performed by: INTERNAL MEDICINE

## 2024-09-25 PROCEDURE — 1123F ACP DISCUSS/DSCN MKR DOCD: CPT | Performed by: INTERNAL MEDICINE

## 2024-09-25 PROCEDURE — 3078F DIAST BP <80 MM HG: CPT | Performed by: INTERNAL MEDICINE

## 2024-09-25 PROCEDURE — 3074F SYST BP LT 130 MM HG: CPT | Performed by: INTERNAL MEDICINE

## 2024-09-25 PROCEDURE — 1157F ADVNC CARE PLAN IN RCRD: CPT | Performed by: INTERNAL MEDICINE

## 2024-09-25 PROCEDURE — 1159F MED LIST DOCD IN RCRD: CPT | Performed by: INTERNAL MEDICINE

## 2024-09-25 PROCEDURE — 99214 OFFICE O/P EST MOD 30 MIN: CPT | Performed by: INTERNAL MEDICINE

## 2024-09-25 RX ORDER — FUROSEMIDE 20 MG/1
20 TABLET ORAL EVERY OTHER DAY
Qty: 45 TABLET | Refills: 1 | Status: SHIPPED | OUTPATIENT
Start: 2024-09-25 | End: 2025-03-24

## 2024-09-25 RX ORDER — DOXYCYCLINE HYCLATE 100 MG
1 TABLET ORAL 2 TIMES DAILY
COMMUNITY
Start: 2024-09-20 | End: 2024-09-25 | Stop reason: SINTOL

## 2024-09-25 RX ORDER — FOLIC ACID 0.4 MG
0.4 TABLET ORAL DAILY
COMMUNITY

## 2024-09-25 RX ORDER — TRAMADOL HYDROCHLORIDE 50 MG/1
50 TABLET ORAL EVERY 8 HOURS PRN
COMMUNITY

## 2024-09-25 RX ORDER — BUTALBITAL, ACETAMINOPHEN AND CAFFEINE 50; 325; 40 MG/1; MG/1; MG/1
1 TABLET ORAL EVERY 4 HOURS PRN
COMMUNITY

## 2024-09-25 RX ORDER — DICYCLOMINE HYDROCHLORIDE 10 MG/1
10 CAPSULE ORAL 4 TIMES DAILY
COMMUNITY

## 2024-09-25 NOTE — PROGRESS NOTES
Most recently seen by Lizzie Ortiz June 2024 and I have reviewed her notes.  Subjective :     Accompanied by caregiver.  Denies chest pressure tightness or heaviness.  Seen in the emergency department for left arm discomfort.  Etiology of the left arm discomfort is unclear  No palpitations lightheadedness presyncope or syncope.      History so Far :  1. Congestive heart failure functional class II/III  2.. Cardiac catheterization 11/2018 LVEDP 10 mmHg LVEF 40% left main 0% stenosis LAD 20% within previous stent in the distal vessel, mid LAD stent 20% stenosis, left circumflex 30% stenosis RCA less than 20% proximal and distal stenosis.  3. Has tendency for hyperkalemia, have to watch closely. Hyperkalemia was the reason for discontinuation of Entresto  4. Chronic kidney disease stage III  5. Tendency for orthostatic hypotension. Not orthostatic today, blood pressure is at target, patient feels well.  6. Degenerative joint disease, ambulates with a wheeled walker.  7. Increased frequency of urination, partly related to BPH  8. Echocardiogram March 3, 2021 LVEF 40 to 45%, normal RV size and function, trivial mitral regurgitation 1+ tricuspid regurgitation RVSP 30 mmHg trivial tricuspid regurgitation  9. Biventricular ICD, device interrogation April 2021 showed less than 1% atrial fibrillation burden longest duration 21 hours, this is addressed by EP service  10. Elevated D-dimer 0.7 6 June 2021-CT chest negative for pulmonary embolism  11. Status post injection right rotator cuff for pain.  12. Carotid ultrasound 2018 less than 50% bilateral carotid stenosis  12. Lexiscan Myoview March 2021 no ischemia LVEF 39%  13. chronically elevated troponin  14. seizure disorder   15. Orthostatic hypotension  16. ACE inhibitor/angiotensin receptor blocker/Entresto intolerance due to hyperkalemia  17. BPH  18. Frequent ER visits, for multitude of reasons, lately for accelerated hypertension, chronic dizziness, and now with  "complaints of chest pain and jaw discomfort. Did not try nitroglycerin but took Tylenol. Very difficult to assess clinically.  19. Agree with the fact that anxiety and/or depression are triggering several of the ER visits.  20. Lexiscan Myoview October 2022-no evidence of ischemia or infarction, LVEF 54%, LVEF was reported to be 39% in March 2021 transient ischemic dilatation 1.0 which is normal.   21.  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  CKD stage IIIb  23.  Echocardiogram March 2021-LVEF 40 to 45%, 1+ tricuspid regurgitation, RVSP 30 mmHg, trivial pulmonary regurgitation, minimal aortic leaflet calcification no evidence of aortic regurgitation peak and mean gradients 8 and 5 respectively  24.  Echocardiogram 12/15/2023-Limited study calcified aortic valve cusps stenotic cyst not assessed due to limited exam, LVEF 60 to 65% reduced RV systolic function mitral annular calcification, mildly dilated left atrium is a limited echo and so aortic valve was not assessed.  Evidence of diastolic dysfunction.      25. 5/29/2024 Nuclear stress   Normal [employer] cardiac perfusion stress test.  No evidence of ischemia or myocardial infarction by perfusion imaging.  Normal left ventricular systolic function, ejection fraction 61%.    Objective   Wt Readings from Last 3 Encounters:   09/25/24 67 kg (147 lb 9.6 oz)   08/06/24 67.6 kg (149 lb)   06/11/24 70.5 kg (155 lb 6.4 oz)            Vitals:    09/25/24 0909   BP: 100/56   BP Location: Left arm   Patient Position: Sitting   Pulse: 70   Weight: 67 kg (147 lb 9.6 oz)   Height: 1.651 m (5' 5\")                Physical Exam:    GENERAL APPEARANCE: in no acute distress.  CHEST: Symmetric and non-tender.  Device generator right infraclavicular area without tenderness or swelling  INTEGUMENT: Skin " warm and dry  HEENT: No gross abnormalities identified.No pallor or scleral icterus.  NECK: Supple, no JVD, no bruit.   NEURO/PSHCY: Alert and oriented x3; appropriate behavior and responses and responses  LUNGS: Clear to auscultation bilaterally; normal respiratory effort.  HEART: Rate and rhythm regular with no evident murmur; no gallop appreciated.   ABDOMEN: Soft, non tender.  MUSCULOSKELETAL: Left arm abduction 60 degrees  EXTREMITIES: Warm  There is no edema noted.    Meds:  Current Outpatient Medications   Medication Instructions    aspirin 81 mg, oral, Daily    atorvastatin (LIPITOR) 80 mg, oral, Nightly    butalbital-acetaminophen-caff -40 mg tablet 1 tablet, oral, Every 4 hours PRN    amilcar oil-levomenthol (FDgard) 25-20.75 mg capsule 1 capsule, oral, Every morning, may take additonal tablet in the evening if needed<BR>    carvedilol (COREG) 12.5 mg, oral, 2 times daily (morning and late afternoon)    cholecalciferol (Vitamin D-3) 1,250 mcg (50,000 unit) capsule 1 capsule, oral, Once Weekly    dicyclomine (BENTYL) 10 mg, oral, 4 times daily    digoxin (LANOXIN) 125 mcg, oral, Daily    divalproex (DEPAKOTE) 500 mg, oral, 3 times daily, AFTER MEALS.    docusate sodium (COLACE) 100 mg, oral, Daily PRN    folic acid (FOLVITE) 0.4 mg, oral, Daily    furosemide (LASIX) 20 mg, oral, Every other day    hydrALAZINE (Apresoline) 50 mg tablet Take ( 1) 50 mg tablet twice day one in AM and one at bedtime,  Take (2) 50 mg tablets in afternoon to total 100 mg    meclizine (ANTIVERT) 25 mg, oral, 3 times daily PRN    mirtazapine (REMERON) 15 mg, oral, Nightly    multivit-min/ferrous fumarate (MULTI VITAMIN ORAL) 1 tablet, oral, Daily    nitroglycerin (NITROSTAT) 0.4 mg, sublingual, Every 5 min PRN    ondansetron (ZOFRAN) 4 mg, oral, 3 times daily PRN    pantoprazole (PROTONIX) 40 mg, oral, Daily    simethicone (MYLICON) 80 mg, oral, Every 6 hours PRN    traMADol (ULTRAM) 50 mg, oral, Every 8 hours PRN           Allergies   Allergen Reactions    Codeine Unknown    Lisinopril Other and Unknown     Hyperkalemia    Penicillins Unknown    Doxycycline Dizziness and Hallucinations     THINGS MOVING IN ROOM, CELLING COMING DOWN              LABS:    Lab Results   Component Value Date    WBC 6.1 03/26/2024    HGB 10.6 (L) 03/26/2024    HCT 31.8 (L) 03/26/2024     03/26/2024    ALT 15 03/26/2024    AST 31 03/26/2024     03/26/2024    K 4.3 03/26/2024     03/26/2024    CREATININE 2.06 (H) 03/26/2024    BUN 27 (H) 03/26/2024    CO2 26 03/26/2024    INR 1.1 03/26/2024                       Patient Active Problem List    Diagnosis Date Noted    ACEI/ARB contraindicated 09/25/2024    Chronic renal disease, stage IV (Multi) 06/11/2024    Acute pulmonary edema (Multi) 06/11/2024    Benign hypertensive kidney disease with chronic kidney disease stage I through stage IV, or unspecified(403.10) 02/21/2024    Aortic systolic murmur on examination 02/21/2024    Hospital discharge follow-up 12/30/2023    DDD (degenerative disc disease), cervical 10/01/2023    Anxiety 10/01/2023    Atrial fibrillation (Multi) 10/01/2023    Benign enlargement of prostate 10/01/2023    Bilateral carotid artery disease (CMS-HCC) 10/01/2023    Cardiomyopathy (Multi) 10/01/2023    Chest pain 10/01/2023    Chronic kidney disease, stage 2 (mild) 10/01/2023    Chronic right shoulder pain 10/01/2023    GERD (gastroesophageal reflux disease) 10/01/2023    ICD (implantable cardioverter-defibrillator) in place 10/01/2023    Labile hypertension 10/01/2023    Moderate dementia (Multi) 10/01/2023    Peripheral neuropathy 10/01/2023    Seizure disorder (Multi) 10/01/2023    Stage 3a chronic kidney disease (Multi) 10/01/2023    Vitamin B12 deficiency 10/01/2023    Vitamin D deficiency disease 10/01/2023    At risk for fall due to comorbid condition 10/01/2023    Body mass index (BMI) of 24.0 to 24.9 in adult 10/01/2023    CAD (coronary artery disease)  10/01/2023    Hypertension 10/01/2023    Insomnia 10/01/2023    Iron deficiency 10/01/2023    Psychosomatic disorder 10/01/2023    Systolic congestive heart failure, NYHA class 2 (Multi) 12/07/2018    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 Coronary artery disease of native artery of native heart with stable angina pectoris (CMS-HCC)  Follow Up In Cardiology      2. ICD (implantable cardioverter-defibrillator) in place  Follow Up In Cardiology    furosemide (Lasix) 20 mg tablet      3. Chronic systolic congestive heart failure, NYHA class 2 (Multi)  Follow Up In Cardiology    furosemide (Lasix) 20 mg tablet      4. Stage 3a chronic kidney disease (Multi)  Follow Up In Cardiology    furosemide (Lasix) 20 mg tablet      5. ACEI/ARB contraindicated           Etiology of left arm pain that prompted ER visit is unclear.  Blood pressure running on the low side in office today, I offered to down titrate hydralazine, patient says at other times his blood pressure will escalate, so we decided to leave all antihypertensives at the same dose.  No change in cardiac medications  This is a patient with coronary artery disease, ICD, improved LV systolic function normal, no clinical congestive heart failure, different types of chest pain, I do not suggest any change in his cardiac medications at this time.  Cannot be on ACE inhibitor or angiotensin receptor blockers because of hyperkalemia.  Follow up : 1 year        Provider Attestation - Scribe documentation    All medical record entries made by the Scribe were at my direction and personally dictated by me. I have reviewed the chart and agree that the record accurately reflects my personal performance of the history, physical exam, discussion and plan.

## 2024-09-25 NOTE — PATIENT INSTRUCTIONS
Exercise diet weight loss program.     Stay plenty HYDRATED     Use My Chart portal for reviewing records, testing and contacting office.      Please bring all medicines, vitamins, and herbal supplements with you in original bottles to every appointment!!    DID YOU KNOW  We have a pharmacy here in the [facility].  They can fill all prescriptions, not just cardiac medications.  Prescriptions from other pharmacies can easily be transferred to the  pharmacy by the  pharmacist on site.   pharmacies offer FREE HOME DELIVERY on medications.   Typically prescriptions can be ready in 10 - 15 minutes. Pharmacy phone # [phone].    Prescriptions will not be filled unless you are compliant with your follow up appointments or have a follow up appointment scheduled as per instruction of your physician. Refills should be requested at the time of your visit.

## 2024-10-07 ENCOUNTER — APPOINTMENT (OUTPATIENT)
Dept: PRIMARY CARE | Facility: CLINIC | Age: 89
End: 2024-10-07
Payer: MEDICARE

## 2024-10-07 VITALS
HEIGHT: 67 IN | TEMPERATURE: 97.6 F | BODY MASS INDEX: 23.04 KG/M2 | SYSTOLIC BLOOD PRESSURE: 130 MMHG | HEART RATE: 56 BPM | WEIGHT: 146.8 LBS | DIASTOLIC BLOOD PRESSURE: 68 MMHG | OXYGEN SATURATION: 97 %

## 2024-10-07 DIAGNOSIS — E55.9 VITAMIN D DEFICIENCY DISEASE: ICD-10-CM

## 2024-10-07 DIAGNOSIS — I77.9 BILATERAL CAROTID ARTERY DISEASE, UNSPECIFIED TYPE (CMS-HCC): ICD-10-CM

## 2024-10-07 DIAGNOSIS — I50.23 ACUTE ON CHRONIC SYSTOLIC (CONGESTIVE) HEART FAILURE: Primary | ICD-10-CM

## 2024-10-07 DIAGNOSIS — J43.0 UNILATERAL EMPHYSEMA (MULTI): ICD-10-CM

## 2024-10-07 PROBLEM — H43.12 VITREOUS HEMORRHAGE, LEFT EYE (MULTI): Status: ACTIVE | Noted: 2024-10-07

## 2024-10-07 PROCEDURE — G2211 COMPLEX E/M VISIT ADD ON: HCPCS | Performed by: INTERNAL MEDICINE

## 2024-10-07 PROCEDURE — 99214 OFFICE O/P EST MOD 30 MIN: CPT | Performed by: INTERNAL MEDICINE

## 2024-10-07 PROCEDURE — 1157F ADVNC CARE PLAN IN RCRD: CPT | Performed by: INTERNAL MEDICINE

## 2024-10-07 PROCEDURE — 3078F DIAST BP <80 MM HG: CPT | Performed by: INTERNAL MEDICINE

## 2024-10-07 PROCEDURE — 1159F MED LIST DOCD IN RCRD: CPT | Performed by: INTERNAL MEDICINE

## 2024-10-07 PROCEDURE — 1123F ACP DISCUSS/DSCN MKR DOCD: CPT | Performed by: INTERNAL MEDICINE

## 2024-10-07 PROCEDURE — 3075F SYST BP GE 130 - 139MM HG: CPT | Performed by: INTERNAL MEDICINE

## 2024-10-07 PROCEDURE — 1160F RVW MEDS BY RX/DR IN RCRD: CPT | Performed by: INTERNAL MEDICINE

## 2024-10-07 RX ORDER — NEBULIZER AND COMPRESSOR
1 EACH MISCELLANEOUS DAILY
Qty: 1 EACH | Refills: 1 | Status: SHIPPED | OUTPATIENT
Start: 2024-10-07

## 2024-10-07 RX ORDER — ASPIRIN 325 MG
50000 TABLET, DELAYED RELEASE (ENTERIC COATED) ORAL
Qty: 90 CAPSULE | Refills: 1 | Status: SHIPPED | OUTPATIENT
Start: 2024-10-07

## 2024-10-08 ENCOUNTER — TELEPHONE (OUTPATIENT)
Dept: PRIMARY CARE | Facility: CLINIC | Age: 89
End: 2024-10-08
Payer: MEDICARE

## 2024-10-08 NOTE — TELEPHONE ENCOUNTER
At appt yesterday Prashanth asked about the name of  home health.    Zee Condon from [facility] [phone] call to provide information requested by Prashanth. Zee Condon [phone] if you have any questions.

## 2024-10-10 PROCEDURE — 93295 DEV INTERROG REMOTE 1/2/MLT: CPT | Performed by: INTERNAL MEDICINE

## 2024-10-10 NOTE — TELEPHONE ENCOUNTER
I called the customer service number and they stated that there facility does not do pt and ot. The representative that call a Zee Condon did not have a proper voicemail set up. I was not sure it was secure. [place] stated this person Zee they do not have anyone by that name,

## 2024-10-23 ASSESSMENT — ENCOUNTER SYMPTOMS
DYSURIA: 0
MYALGIAS: 0
ACTIVITY CHANGE: 0
COUGH: 0
SORE THROAT: 0
LIGHT-HEADEDNESS: 0
ABDOMINAL PAIN: 0

## 2024-10-23 NOTE — PROGRESS NOTES
JUAN A Peraltaey, kathy 89 y.o. male was seen today:   Chief Complaint   Patient presents with    Headaches, med refill       VISIT SUMMERY:  RL comes here with his friend and complaining of headache.  He has recently been in the emergency department for the similar symptoms.  His blood pressure has been running high.  His blood pressure is fluctuating between 160 mmHg to 140 mmHg.  Patient gets worried about it.  And when he gets his number hide he goes to the emergency department.  He is more worried and anxious rather than true headache.  Otherwise he is doing well.  He does not have any visual disturbance.  He denies any nausea or vomiting.  Today in the office his blood pressure is 130/68 mmHg.  Patient is medications refill.  He does not have any blood pressure cuff we thought maybe keeping a blood pressure diary at home would be helpful.          TODAY'S VISIT  DX:     1. Acute on chronic systolic (congestive) heart failure  miscellaneous medical supply (Blood Pressure Cuff) misc      2. Vitamin D deficiency disease  cholecalciferol (Vitamin D-3) 50,000 unit capsule      3. Unilateral emphysema (Multi)  Stable      4. Bilateral carotid artery disease, unspecified type (CMS-HCC)  Patient was given a blood pressure log to keep home BP diary twice daily for 2 weeks. Patient was adviced to bring the diary and BP measuring kit to next offfice visit in 3-4 weeks.             MEDICAL DECISION MAKING:  - Treatment and therapy plan are as above   - Active medical co morbidities have been considered.   - Recent lab work and relevant imaging studies were reviewed.    - Correspondence/notes from specialty consultants were checked.    - Next Follow up in 2 to 3 weeks with blood pressure log.    Review of Systems   Constitutional:  Negative for activity change.   HENT:  Negative for congestion and sore throat.    Respiratory:  Negative for cough.    Cardiovascular:  Negative for chest pain.   Gastrointestinal:  Negative for  "abdominal pain.   Endocrine: Negative for polyuria.   Genitourinary:  Negative for dysuria.   Musculoskeletal:  Negative for myalgias.   Skin:  Negative for rash.   Neurological:  Negative for light-headedness.   Psychiatric/Behavioral:  Negative for behavioral problems.      Visit Vitals  /68 (BP Location: Left arm, Patient Position: Sitting)   Pulse 56   Temp 36.4 °C (97.6 °F)   Ht 1.702 m (5' 7\")   Wt 66.6 kg (146 lb 12.8 oz)   SpO2 97% Comment: RA   BMI 22.99 kg/m²   Smoking Status Never   BSA 1.77 m²         Wt Readings from Last 10 Encounters:   10/07/24 66.6 kg (146 lb 12.8 oz)   09/25/24 67 kg (147 lb 9.6 oz)   08/06/24 67.6 kg (149 lb)   06/11/24 70.5 kg (155 lb 6.4 oz)   06/04/24 71.7 kg (158 lb)   05/08/24 71.5 kg (157 lb 9.6 oz)   03/26/24 70.8 kg (156 lb)   03/26/24 70.3 kg (155 lb)   02/21/24 68.5 kg (151 lb)   02/13/24 66.2 kg (146 lb)     Physical Exam  Vitals and nursing note reviewed.   Constitutional:       Appearance: Normal appearance.   HENT:      Head: Normocephalic.      Right Ear: Tympanic membrane normal.      Left Ear: Tympanic membrane normal.      Nose: Nose normal.      Mouth/Throat:      Mouth: Mucous membranes are moist.   Cardiovascular:      Rate and Rhythm: Normal rate and regular rhythm.      Pulses: Normal pulses.      Heart sounds: No murmur heard.  Pulmonary:      Effort: No respiratory distress.      Breath sounds: Normal breath sounds.   Abdominal:      Palpations: Abdomen is soft.   Musculoskeletal:      Cervical back: Neck supple.      Right lower leg: No edema.      Left lower leg: No edema.   Skin:     General: Skin is warm.      Findings: No rash.   Neurological:      General: No focal deficit present.      Mental Status: He is alert and oriented to person, place, and time.   Psychiatric:         Mood and Affect: Mood normal.        MEDICATIONS:   Current Outpatient Medications   Medication Instructions    aspirin 81 mg, oral, Daily    atorvastatin (LIPITOR) 80 mg, " oral, Nightly    butalbital-acetaminophen-caff -40 mg tablet 1 tablet, oral, Every 4 hours PRN    amilcar oil-levomenthol (FDgard) 25-20.75 mg capsule 1 capsule, oral, Every morning, may take additonal tablet in the evening if needed      carvedilol (COREG) 12.5 mg, oral, 2 times daily (morning and late afternoon)    cholecalciferol (VITAMIN D-3) 50,000 Units, oral, Once Weekly    dicyclomine (BENTYL) 10 mg, oral, 4 times daily    digoxin (LANOXIN) 125 mcg, oral, Daily    divalproex (DEPAKOTE) 500 mg, oral, 3 times daily, AFTER MEALS.    docusate sodium (COLACE) 100 mg, oral, Daily PRN    folic acid (FOLVITE) 0.4 mg, oral, Daily    furosemide (LASIX) 20 mg, oral, Every other day    hydrALAZINE (Apresoline) 50 mg tablet Take ( 1) 50 mg tablet twice day one in AM and one at bedtime,  Take (2) 50 mg tablets in afternoon to total 100 mg    meclizine (ANTIVERT) 25 mg, oral, 3 times daily PRN    mirtazapine (REMERON) 15 mg, oral, Nightly    miscellaneous medical supply (Blood Pressure Cuff) misc 1 each, miscellaneous, Daily, BP to be checked out twice daily.    multivit-min/ferrous fumarate (MULTI VITAMIN ORAL) 1 tablet, oral, Daily    nitroglycerin (NITROSTAT) 0.4 mg, sublingual, Every 5 min PRN    ondansetron (ZOFRAN) 4 mg, oral, 3 times daily PRN    pantoprazole (PROTONIX) 40 mg, oral, Daily    simethicone (MYLICON) 80 mg, oral, Every 6 hours PRN    traMADol (ULTRAM) 50 mg, oral, Every 8 hours PRN       RECENT LABS:  Lab Results   Component Value Date    WBC 6.1 03/26/2024    HGB 10.6 (L) 03/26/2024    HCT 31.8 (L) 03/26/2024     03/26/2024    ALT 15 03/26/2024    AST 31 03/26/2024     03/26/2024    K 4.3 03/26/2024     03/26/2024    CREATININE 2.06 (H) 03/26/2024    BUN 27 (H) 03/26/2024    CO2 26 03/26/2024    INR 1.1 03/26/2024     Lab Results   Component Value Date    GLUCOSE 95 03/26/2024    CALCIUM 9.3 03/26/2024     03/26/2024    K 4.3 03/26/2024    CO2 26 03/26/2024      03/26/2024    BUN 27 (H) 03/26/2024    CREATININE 2.06 (H) 03/26/2024      Lab Results   Component Value Date    CREATININE 2.06 (H) 03/26/2024             P.S: This note was completed using Dragon voice recognition technology and may include unintended errors with respect to translation of words, typographical errors or grammar errors which may not have been identified while finalizing the chart.

## 2024-11-12 ENCOUNTER — OFFICE VISIT (OUTPATIENT)
Dept: PRIMARY CARE | Facility: CLINIC | Age: 89
End: 2024-11-12
Payer: MEDICARE

## 2024-11-12 VITALS
BODY MASS INDEX: 23.34 KG/M2 | TEMPERATURE: 97.2 F | SYSTOLIC BLOOD PRESSURE: 136 MMHG | OXYGEN SATURATION: 98 % | WEIGHT: 149 LBS | DIASTOLIC BLOOD PRESSURE: 80 MMHG | HEART RATE: 72 BPM

## 2024-11-12 DIAGNOSIS — R42 DIZZINESS: Primary | ICD-10-CM

## 2024-11-12 DIAGNOSIS — G40.909 SEIZURE DISORDER (MULTI): ICD-10-CM

## 2024-11-12 DIAGNOSIS — I10 PRIMARY HYPERTENSION: ICD-10-CM

## 2024-11-12 PROCEDURE — 1157F ADVNC CARE PLAN IN RCRD: CPT | Performed by: INTERNAL MEDICINE

## 2024-11-12 PROCEDURE — 1124F ACP DISCUSS-NO DSCNMKR DOCD: CPT | Performed by: INTERNAL MEDICINE

## 2024-11-12 PROCEDURE — 1159F MED LIST DOCD IN RCRD: CPT | Performed by: INTERNAL MEDICINE

## 2024-11-12 PROCEDURE — 1036F TOBACCO NON-USER: CPT | Performed by: INTERNAL MEDICINE

## 2024-11-12 PROCEDURE — 1160F RVW MEDS BY RX/DR IN RCRD: CPT | Performed by: INTERNAL MEDICINE

## 2024-11-12 PROCEDURE — 3079F DIAST BP 80-89 MM HG: CPT | Performed by: INTERNAL MEDICINE

## 2024-11-12 PROCEDURE — 99214 OFFICE O/P EST MOD 30 MIN: CPT | Performed by: INTERNAL MEDICINE

## 2024-11-12 PROCEDURE — G2211 COMPLEX E/M VISIT ADD ON: HCPCS | Performed by: INTERNAL MEDICINE

## 2024-11-12 PROCEDURE — 3075F SYST BP GE 130 - 139MM HG: CPT | Performed by: INTERNAL MEDICINE

## 2024-11-12 RX ORDER — MECLIZINE HYDROCHLORIDE 25 MG/1
25 TABLET ORAL 3 TIMES DAILY PRN
Qty: 60 TABLET | Refills: 3 | Status: SHIPPED | OUTPATIENT
Start: 2024-11-12 | End: 2025-11-12

## 2024-11-12 RX ORDER — SCOLOPAMINE TRANSDERMAL SYSTEM 1 MG/1
1 PATCH, EXTENDED RELEASE TRANSDERMAL
Qty: 10 PATCH | Refills: 1 | Status: SHIPPED | OUTPATIENT
Start: 2024-11-12 | End: 2025-01-11

## 2024-11-12 ASSESSMENT — ENCOUNTER SYMPTOMS: DEPRESSION: 0

## 2024-11-12 ASSESSMENT — PATIENT HEALTH QUESTIONNAIRE - PHQ9
10. IF YOU CHECKED OFF ANY PROBLEMS, HOW DIFFICULT HAVE THESE PROBLEMS MADE IT FOR YOU TO DO YOUR WORK, TAKE CARE OF THINGS AT HOME, OR GET ALONG WITH OTHER PEOPLE: SOMEWHAT DIFFICULT
SUM OF ALL RESPONSES TO PHQ9 QUESTIONS 1 AND 2: 1
2. FEELING DOWN, DEPRESSED OR HOPELESS: SEVERAL DAYS
1. LITTLE INTEREST OR PLEASURE IN DOING THINGS: NOT AT ALL

## 2024-12-01 ASSESSMENT — ENCOUNTER SYMPTOMS
MYALGIAS: 0
LIGHT-HEADEDNESS: 0
COUGH: 0
SORE THROAT: 0
ACTIVITY CHANGE: 0
ABDOMINAL PAIN: 0
DYSURIA: 0

## 2024-12-02 NOTE — PROGRESS NOTES
JUAN A William, [facility] 89 y.o. male was seen today:     Chief Complaint   Patient presents with    Med Refill     Med mgmt        VISIT SUMMERY:    JUAN A Nataliia   To discuss about his chronic medical conditions.  He does have hypertension, dizziness and also seizure disorder.  He is doing well.  He does not have any medical complaint.  He does need his refill on medications.  Patient occasionally gets dizzy with high blood pressure.  We are trying to manage his blood pressure well.  Today in the office his blood pressure is 136/80 mmHg.  Patient may need some symptomatic relief with meclizine.  I have also given him scopolamine patch.  His routine blood work is up-to-date.  Patient also received age-appropriate immunization in the past.    Med Refill  Pertinent negatives include no abdominal pain, chest pain, congestion, coughing, myalgias, rash or sore throat.     MEDICATIONS:   Current Outpatient Medications   Medication Instructions    aspirin 81 mg, oral, Daily    atorvastatin (LIPITOR) 80 mg, oral, Nightly    butalbital-acetaminophen-caff -40 mg tablet 1 tablet, Every 4 hours PRN    carvedilol (COREG) 12.5 mg, oral, 2 times daily (morning and late afternoon)    cholecalciferol (VITAMIN D-3) 50,000 Units, oral, Once Weekly    dicyclomine (BENTYL) 10 mg, 4 times daily    digoxin (LANOXIN) 125 mcg, oral, Daily    divalproex (DEPAKOTE) 500 mg, oral, 3 times daily, AFTER MEALS.    docusate sodium (COLACE) 100 mg, oral, Daily PRN    folic acid (FOLVITE) 0.4 mg, Daily    furosemide (LASIX) 20 mg, oral, Every other day    hydrALAZINE (Apresoline) 50 mg tablet Take ( 1) 50 mg tablet twice day one in AM and one at bedtime,  Take (2) 50 mg tablets in afternoon to total 100 mg    meclizine (ANTIVERT) 25 mg, oral, 3 times daily PRN    mirtazapine (REMERON) 15 mg, Nightly    miscellaneous medical supply (Blood Pressure Cuff) misc 1 each, miscellaneous, Daily, BP to be checked out twice daily.    multivit-min/ferrous fumarate  (MULTI VITAMIN ORAL) 1 tablet, Daily    nitroglycerin (NITROSTAT) 0.4 mg, sublingual, Every 5 min PRN    pantoprazole (PROTONIX) 40 mg, oral, Daily    scopolamine (Transderm-Scop) 1 mg over 3 days patch 3 day 1 patch, transdermal, Every 72 hours PRN    simethicone (MYLICON) 80 mg, Every 6 hours PRN       TODAY'S VISIT  DX:     1. Dizziness  scopolamine (Transderm-Scop) 1 mg over 3 days patch 3 day      2. Seizure disorder (Multi)  meclizine (Antivert) 25 mg tablet    Refilled Depakote      3. Primary hypertension  Office blood pressure is normal.  He will continue with current antihypertensive medications.           MEDICAL DECISION MAKING:  - Treatment and therapy plan are as above   - Active medical co morbidities have been considered.   - Recent lab work and relevant imaging studies were reviewed.    - Correspondence/notes from specialty consultants were checked.    - Next Follow up in 3 months      Review of Systems   Constitutional:  Negative for activity change.   HENT:  Negative for congestion and sore throat.    Respiratory:  Negative for cough.    Cardiovascular:  Negative for chest pain.   Gastrointestinal:  Negative for abdominal pain.   Endocrine: Negative for polyuria.   Genitourinary:  Negative for dysuria.   Musculoskeletal:  Negative for myalgias.   Skin:  Negative for rash.   Neurological:  Negative for light-headedness.   Psychiatric/Behavioral:  Negative for behavioral problems.      Visit Vitals  /80 (BP Location: Left arm, Patient Position: Sitting, BP Cuff Size: Adult)   Pulse 72   Temp 36.2 °C (97.2 °F) (Tympanic)   Wt 67.6 kg (149 lb)   SpO2 98%   BMI 23.34 kg/m²   Smoking Status Never   BSA 1.79 m²         Wt Readings from Last 10 Encounters:   11/12/24 67.6 kg (149 lb)   10/07/24 66.6 kg (146 lb 12.8 oz)   09/25/24 67 kg (147 lb 9.6 oz)   08/06/24 67.6 kg (149 lb)   06/11/24 70.5 kg (155 lb 6.4 oz)   06/04/24 71.7 kg (158 lb)   05/08/24 71.5 kg (157 lb 9.6 oz)   03/26/24 70.8 kg (156  lb)   03/26/24 70.3 kg (155 lb)   02/21/24 68.5 kg (151 lb)     Physical Exam  Vitals and nursing note reviewed.   Constitutional:       Appearance: Normal appearance.   HENT:      Head: Normocephalic.      Right Ear: Tympanic membrane normal.      Left Ear: Tympanic membrane normal.      Nose: Nose normal.      Mouth/Throat:      Mouth: Mucous membranes are moist.   Cardiovascular:      Rate and Rhythm: Normal rate and regular rhythm.      Pulses: Normal pulses.      Heart sounds: No murmur heard.  Pulmonary:      Effort: No respiratory distress.      Breath sounds: Normal breath sounds.   Abdominal:      Palpations: Abdomen is soft.   Musculoskeletal:      Cervical back: Neck supple.      Right lower leg: No edema.      Left lower leg: No edema.   Skin:     General: Skin is warm.      Findings: No rash.   Neurological:      General: No focal deficit present.      Mental Status: He is alert and oriented to person, place, and time.   Psychiatric:         Mood and Affect: Mood normal.        RECENT LABS:  Lab Results   Component Value Date    WBC 6.1 03/26/2024    HGB 10.6 (L) 03/26/2024    HCT 31.8 (L) 03/26/2024     03/26/2024    ALT 15 03/26/2024    AST 31 03/26/2024     03/26/2024    K 4.3 03/26/2024     03/26/2024    CREATININE 2.06 (H) 03/26/2024    BUN 27 (H) 03/26/2024    CO2 26 03/26/2024    INR 1.1 03/26/2024     Lab Results   Component Value Date    GLUCOSE 95 03/26/2024    CALCIUM 9.3 03/26/2024     03/26/2024    K 4.3 03/26/2024    CO2 26 03/26/2024     03/26/2024    BUN 27 (H) 03/26/2024    CREATININE 2.06 (H) 03/26/2024      Lab Results   Component Value Date    CREATININE 2.06 (H) 03/26/2024             P.S: This note was completed using Dragon voice recognition technology and may include unintended errors with respect to translation of words, typographical errors or grammar errors which may not have been identified while finalizing the chart.

## 2024-12-04 ENCOUNTER — APPOINTMENT (OUTPATIENT)
Dept: PRIMARY CARE | Facility: CLINIC | Age: 89
End: 2024-12-04
Payer: MEDICARE

## 2024-12-13 ENCOUNTER — TELEPHONE (OUTPATIENT)
Dept: PRIMARY CARE | Facility: CLINIC | Age: 89
End: 2024-12-13
Payer: MEDICARE

## 2024-12-26 ENCOUNTER — TELEPHONE (OUTPATIENT)
Dept: PRIMARY CARE | Facility: CLINIC | Age: 89
End: 2024-12-26
Payer: MEDICARE

## 2024-12-26 NOTE — TELEPHONE ENCOUNTER
Josselin from [facility] called for verbal orders for skilled nursing, PT and OT for home nat, please advise

## 2025-01-07 ENCOUNTER — APPOINTMENT (OUTPATIENT)
Dept: PRIMARY CARE | Facility: CLINIC | Age: OVER 89
End: 2025-01-07
Payer: MEDICARE

## 2025-01-08 ENCOUNTER — APPOINTMENT (OUTPATIENT)
Dept: PRIMARY CARE | Facility: CLINIC | Age: OVER 89
End: 2025-01-08
Payer: MEDICARE

## 2025-01-14 ENCOUNTER — APPOINTMENT (OUTPATIENT)
Dept: PRIMARY CARE | Facility: CLINIC | Age: OVER 89
End: 2025-01-14
Payer: MEDICARE

## 2025-01-14 VITALS
OXYGEN SATURATION: 99 % | BODY MASS INDEX: 22.77 KG/M2 | HEART RATE: 89 BPM | DIASTOLIC BLOOD PRESSURE: 70 MMHG | SYSTOLIC BLOOD PRESSURE: 138 MMHG | TEMPERATURE: 97.2 F | WEIGHT: 145.4 LBS

## 2025-01-14 DIAGNOSIS — J43.0 UNILATERAL EMPHYSEMA (MULTI): ICD-10-CM

## 2025-01-14 DIAGNOSIS — R56.9 SEIZURE (MULTI): ICD-10-CM

## 2025-01-14 DIAGNOSIS — H43.12 VITREOUS HEMORRHAGE, LEFT EYE (MULTI): ICD-10-CM

## 2025-01-14 DIAGNOSIS — M79.675 GREAT TOE PAIN, LEFT: Primary | ICD-10-CM

## 2025-01-14 DIAGNOSIS — J81.0 ACUTE PULMONARY EDEMA: ICD-10-CM

## 2025-01-14 DIAGNOSIS — I48.0 PAROXYSMAL ATRIAL FIBRILLATION (MULTI): ICD-10-CM

## 2025-01-14 DIAGNOSIS — N18.32 CHRONIC KIDNEY DISEASE, STAGE 3B (MULTI): ICD-10-CM

## 2025-01-14 DIAGNOSIS — I50.23 ACUTE ON CHRONIC SYSTOLIC (CONGESTIVE) HEART FAILURE: ICD-10-CM

## 2025-01-14 DIAGNOSIS — F03.B0 MODERATE DEMENTIA WITHOUT BEHAVIORAL DISTURBANCE, PSYCHOTIC DISTURBANCE, MOOD DISTURBANCE, OR ANXIETY, UNSPECIFIED DEMENTIA TYPE: ICD-10-CM

## 2025-01-14 PROCEDURE — 1158F ADVNC CARE PLAN TLK DOCD: CPT | Performed by: INTERNAL MEDICINE

## 2025-01-14 PROCEDURE — 3075F SYST BP GE 130 - 139MM HG: CPT | Performed by: INTERNAL MEDICINE

## 2025-01-14 PROCEDURE — 1157F ADVNC CARE PLAN IN RCRD: CPT | Performed by: INTERNAL MEDICINE

## 2025-01-14 PROCEDURE — 3078F DIAST BP <80 MM HG: CPT | Performed by: INTERNAL MEDICINE

## 2025-01-14 PROCEDURE — 99214 OFFICE O/P EST MOD 30 MIN: CPT | Performed by: INTERNAL MEDICINE

## 2025-01-14 PROCEDURE — 1160F RVW MEDS BY RX/DR IN RCRD: CPT | Performed by: INTERNAL MEDICINE

## 2025-01-14 PROCEDURE — 1159F MED LIST DOCD IN RCRD: CPT | Performed by: INTERNAL MEDICINE

## 2025-01-14 PROCEDURE — 1123F ACP DISCUSS/DSCN MKR DOCD: CPT | Performed by: INTERNAL MEDICINE

## 2025-01-14 PROCEDURE — G2211 COMPLEX E/M VISIT ADD ON: HCPCS | Performed by: INTERNAL MEDICINE

## 2025-01-14 PROCEDURE — 1036F TOBACCO NON-USER: CPT | Performed by: INTERNAL MEDICINE

## 2025-01-14 ASSESSMENT — ENCOUNTER SYMPTOMS
ACTIVITY CHANGE: 0
LIGHT-HEADEDNESS: 0
DYSURIA: 0
COUGH: 0
MYALGIAS: 0
SORE THROAT: 0
ABDOMINAL PAIN: 0

## 2025-01-14 NOTE — PROGRESS NOTES
kathy Langford 89 y.o. male was seen today:     Chief Complaint   Patient presents with    Follow-up       VISIT MAIDA:  Patient will be referred to podiatry for left toe pain.  There is also concern for patient caring for self at home alone considering that patient has had a fall 3 months ago and reports feeling dizzy due to his vertigo recently.  Plan to discuss plan for nursing facility consideration.       HPI    Patient is a 89-year-old male with PMHx of A-fib, CAD, HTN, ICD, CHF, GERD, stage IIIa CKD, DDD, peripheral neuropathy, who presents with complaint of left toe pain.  Patient reports left big toe pain onset 1 and half months ago.  Pain is worse when laying down and Tylenol helps a little bit.  Notes that he fell and broke his left foot 3 months ago.  After falling patient went to acute rehab and then was able to go back home.  Patient lives alone at home and uses a walker to ambulate.  Notes that sometimes he becomes dizzy due to his vertigo and feels like he is going to fall.  No recent falls.  Patient receives help from a neighbor who lives across the street, Zee, who helps with doctors appointments and also helps care for him at home.  Patient does not need refills for medications.    Patient request that his neighbor and caregiver, Zee's number to be added to his chart for an extra form of contact.    Neighbor and caregiver- Zee - [phone]        MEDICATIONS:   Current Outpatient Medications   Medication Instructions    aspirin 81 mg, oral, Daily    atorvastatin (LIPITOR) 80 mg, oral, Nightly    butalbital-acetaminophen-caff -40 mg tablet 1 tablet, Every 4 hours PRN    carvedilol (COREG) 12.5 mg, oral, 2 times daily (morning and late afternoon)    cholecalciferol (VITAMIN D-3) 50,000 Units, oral, Once Weekly    dicyclomine (BENTYL) 10 mg, 4 times daily    digoxin (LANOXIN) 125 mcg, oral, Daily    divalproex (DEPAKOTE) 500 mg, oral, 3 times daily, AFTER MEALS.    docusate  sodium (COLACE) 100 mg, oral, Daily PRN    folic acid (FOLVITE) 0.4 mg, Daily    furosemide (LASIX) 20 mg, oral, Every other day    hydrALAZINE (Apresoline) 50 mg tablet Take ( 1) 50 mg tablet twice day one in AM and one at bedtime,  Take (2) 50 mg tablets in afternoon to total 100 mg    meclizine (ANTIVERT) 25 mg, oral, 3 times daily PRN    mirtazapine (REMERON) 15 mg, Nightly    miscellaneous medical supply (Blood Pressure Cuff) misc 1 each, miscellaneous, Daily, BP to be checked out twice daily.    multivit-min/ferrous fumarate (MULTI VITAMIN ORAL) 1 tablet, Daily    nitroglycerin (NITROSTAT) 0.4 mg, sublingual, Every 5 min PRN    pantoprazole (PROTONIX) 40 mg, oral, Daily    scopolamine (Transderm-Scop) 1 mg over 3 days patch 3 day 1 patch, transdermal, Every 72 hours PRN    simethicone (MYLICON) 80 mg, Every 6 hours PRN       TODAY'S VISIT  DX:     1. Great toe pain, left  Referral to Podiatry      2. Chronic kidney disease, stage 3b (Multi)  Stable.  Nephrotoxins have been avoided      3. Acute pulmonary edema  Does not have any shortness of breath at this time.  However he is lacking endurance and gets short of breath      4. Acute on chronic systolic (congestive) heart failure  Follows with cardiology Dr. Ochoa.  No lower extremity edema.  Currently on digoxin and Lasix every other day      5. Moderate dementia without behavioral disturbance, psychotic disturbance, mood disturbance, or anxiety, unspecified dementia type  Does not drive.  Pleasantly demented.  Neighbors help him.  However we discussed about assisted living for his safety.  Patient will think about it      6. Vitreous hemorrhage, left eye (Multi)  Follow-up with ophthalmology.      7. Unilateral emphysema (Multi)  Stable at this time.  Does not use rescue inhaler too often.      8. Paroxysmal atrial fibrillation (Multi)  Rate is controlled with carvedilol.  Not on any anticoagulation for fall risk      9. Seizure (Multi)  Stable on  [facility]             MEDICAL DECISION MAKING:  - Treatment and therapy plan are as above   - Active medical co morbidities have been considered.   - Recent lab work and relevant imaging studies were reviewed.    - Correspondence/notes from specialty consultants were checked.    - Next Follow up in 6 months.     Review of Systems   Constitutional:  Negative for activity change.   HENT:  Negative for congestion and sore throat.    Respiratory:  Negative for cough.    Cardiovascular:  Negative for chest pain.   Gastrointestinal:  Negative for abdominal pain.   Endocrine: Negative for polyuria.   Genitourinary:  Negative for dysuria.   Musculoskeletal:  Negative for myalgias.   Skin:  Negative for rash.   Neurological:  Negative for light-headedness.   Psychiatric/Behavioral:  Negative for behavioral problems.      12 point system as reported in HPI.      Visit Vitals  /70 (BP Location: Left arm, Patient Position: Sitting, BP Cuff Size: Adult)   Pulse 89   Temp 36.2 °C (97.2 °F) (Temporal)   Wt 66 kg (145 lb 6.4 oz)   SpO2 99% Comment: RA   BMI 22.77 kg/m²   Smoking Status Never   BSA 1.77 m²         Wt Readings from Last 10 Encounters:   01/14/25 66 kg (145 lb 6.4 oz)   11/12/24 67.6 kg (149 lb)   10/07/24 66.6 kg (146 lb 12.8 oz)   09/25/24 67 kg (147 lb 9.6 oz)   08/06/24 67.6 kg (149 lb)   06/11/24 70.5 kg (155 lb 6.4 oz)   06/04/24 71.7 kg (158 lb)   05/08/24 71.5 kg (157 lb 9.6 oz)   03/26/24 70.8 kg (156 lb)   03/26/24 70.3 kg (155 lb)     Physical Exam  Constitutional:       General: He is not in acute distress.     Appearance: Normal appearance.   HENT:      Head: Normocephalic and atraumatic.   Cardiovascular:      Rate and Rhythm: Normal rate.      Pulses: Normal pulses.   Pulmonary:      Effort: Pulmonary effort is normal. No respiratory distress.      Breath sounds: Normal breath sounds. No wheezing.   Abdominal:      General: Abdomen is flat. There is no distension.      Palpations: Abdomen is soft.       Tenderness: There is no abdominal tenderness. There is no guarding or rebound.   Musculoskeletal:      Right lower leg: No edema.      Left lower leg: No edema.      Comments: First digit of foot with swelling and tenderness to palpation.   Skin:     General: Skin is warm and dry.   Neurological:      Mental Status: He is alert.   Psychiatric:         Mood and Affect: Mood normal.         Behavior: Behavior normal.        RECENT LABS:  Lab Results   Component Value Date    WBC 6.1 03/26/2024    HGB 10.6 (L) 03/26/2024    HCT 31.8 (L) 03/26/2024     03/26/2024    ALT 15 03/26/2024    AST 31 03/26/2024     03/26/2024    K 4.3 03/26/2024     03/26/2024    CREATININE 2.06 (H) 03/26/2024    BUN 27 (H) 03/26/2024    CO2 26 03/26/2024    INR 1.1 03/26/2024     Lab Results   Component Value Date    GLUCOSE 95 03/26/2024    CALCIUM 9.3 03/26/2024     03/26/2024    K 4.3 03/26/2024    CO2 26 03/26/2024     03/26/2024    BUN 27 (H) 03/26/2024    CREATININE 2.06 (H) 03/26/2024        P.S: This note was completed using Dragon voice recognition technology and may include unintended errors with respect to translation of words, typographical errors or grammar errors which may not have been identified while finalizing the chart.

## 2025-01-16 PROCEDURE — 93295 DEV INTERROG REMOTE 1/2/MLT: CPT | Performed by: INTERNAL MEDICINE

## 2025-01-21 ENCOUNTER — TRANSCRIBE ORDERS (OUTPATIENT)
Dept: CARDIOLOGY | Facility: CLINIC | Age: OVER 89
End: 2025-01-21
Payer: MEDICARE

## 2025-01-21 DIAGNOSIS — Z95.810 ICD (IMPLANTABLE CARDIOVERTER-DEFIBRILLATOR) IN PLACE: ICD-10-CM

## 2025-02-13 ENCOUNTER — TELEPHONE (OUTPATIENT)
Dept: CARDIOLOGY | Facility: CLINIC | Age: OVER 89
End: 2025-02-13
Payer: MEDICARE

## 2025-02-13 NOTE — TELEPHONE ENCOUNTER
Pt's friend called in asking for refills. Upon chart review PCP fills digoxin and a script was sent by their team 8/6/24. Provided with their office phone #

## 2025-02-14 DIAGNOSIS — I48.0 PAROXYSMAL ATRIAL FIBRILLATION (MULTI): ICD-10-CM

## 2025-02-14 NOTE — TELEPHONE ENCOUNTER
Zee called office today advising that patient has 2 pills left of the Digoxin 125 mcg. Per our records it was last filled 2024 for a year supply.  Zee advised that he does not have anymore refills and patient may have thrown out the wrong prescription bottle. Call was placed to pharmacy to clarify prescription. Per the pharmacy patient never filled the prescription that was sent in on 2024 there for it has  a new script will need to be sent in.    Medication pended please advise and sign.    Rx request  Last OV 2025  Pending OV 2025

## 2025-02-15 RX ORDER — DIGOXIN 125 MCG
125 TABLET ORAL DAILY
Qty: 90 TABLET | Refills: 3 | Status: SHIPPED | OUTPATIENT
Start: 2025-02-15 | End: 2026-02-15

## 2025-03-13 ENCOUNTER — TELEPHONE (OUTPATIENT)
Dept: PRIMARY CARE | Facility: CLINIC | Age: OVER 89
End: 2025-03-13
Payer: MEDICARE

## 2025-03-13 NOTE — TELEPHONE ENCOUNTER
Dr. Alford message. Patient needs office visit to complete [facility] form. Message sent to Clerical for scheduling.

## 2025-03-19 ENCOUNTER — APPOINTMENT (OUTPATIENT)
Dept: PRIMARY CARE | Facility: CLINIC | Age: OVER 89
End: 2025-03-19
Payer: MEDICARE

## 2025-03-19 VITALS
WEIGHT: 150.6 LBS | BODY MASS INDEX: 23.64 KG/M2 | OXYGEN SATURATION: 96 % | SYSTOLIC BLOOD PRESSURE: 136 MMHG | TEMPERATURE: 98.2 F | DIASTOLIC BLOOD PRESSURE: 64 MMHG | HEIGHT: 67 IN | HEART RATE: 69 BPM

## 2025-03-19 DIAGNOSIS — R14.3 FLATULENCE: ICD-10-CM

## 2025-03-19 DIAGNOSIS — Z91.81 AT RISK FOR FALL DUE TO COMORBID CONDITION: Primary | ICD-10-CM

## 2025-03-19 DIAGNOSIS — I25.5 ISCHEMIC CARDIOMYOPATHY: ICD-10-CM

## 2025-03-19 DIAGNOSIS — G62.9 PERIPHERAL POLYNEUROPATHY: ICD-10-CM

## 2025-03-19 DIAGNOSIS — I50.22 CHRONIC SYSTOLIC CONGESTIVE HEART FAILURE, NYHA CLASS 2: ICD-10-CM

## 2025-03-19 DIAGNOSIS — G40.909 SEIZURE DISORDER (MULTI): ICD-10-CM

## 2025-03-19 PROBLEM — I50.23 ACUTE ON CHRONIC SYSTOLIC (CONGESTIVE) HEART FAILURE: Status: RESOLVED | Noted: 2024-10-07 | Resolved: 2025-03-19

## 2025-03-19 PROBLEM — R07.9 CHEST PAIN: Status: RESOLVED | Noted: 2023-10-01 | Resolved: 2025-03-19

## 2025-03-19 PROCEDURE — 1036F TOBACCO NON-USER: CPT | Performed by: INTERNAL MEDICINE

## 2025-03-19 PROCEDURE — 99214 OFFICE O/P EST MOD 30 MIN: CPT | Performed by: INTERNAL MEDICINE

## 2025-03-19 PROCEDURE — 1123F ACP DISCUSS/DSCN MKR DOCD: CPT | Performed by: INTERNAL MEDICINE

## 2025-03-19 PROCEDURE — 3075F SYST BP GE 130 - 139MM HG: CPT | Performed by: INTERNAL MEDICINE

## 2025-03-19 PROCEDURE — 3078F DIAST BP <80 MM HG: CPT | Performed by: INTERNAL MEDICINE

## 2025-03-19 PROCEDURE — G2211 COMPLEX E/M VISIT ADD ON: HCPCS | Performed by: INTERNAL MEDICINE

## 2025-03-19 PROCEDURE — 1157F ADVNC CARE PLAN IN RCRD: CPT | Performed by: INTERNAL MEDICINE

## 2025-03-19 PROCEDURE — 1159F MED LIST DOCD IN RCRD: CPT | Performed by: INTERNAL MEDICINE

## 2025-03-19 RX ORDER — SCOPOLAMINE 1 MG/3D
1 PATCH, EXTENDED RELEASE TRANSDERMAL
COMMUNITY

## 2025-03-19 RX ORDER — MECLIZINE HYDROCHLORIDE 25 MG/1
25 TABLET ORAL 3 TIMES DAILY PRN
Qty: 90 TABLET | Refills: 3 | Status: SHIPPED | OUTPATIENT
Start: 2025-03-19 | End: 2026-03-19

## 2025-03-19 RX ORDER — DICYCLOMINE HYDROCHLORIDE 10 MG/1
10 CAPSULE ORAL 4 TIMES DAILY
Qty: 90 CAPSULE | Refills: 3 | Status: SHIPPED | OUTPATIENT
Start: 2025-03-19

## 2025-03-19 NOTE — PROGRESS NOTES
JUAN A William, pleasant 89 y.o. male was seen today for:      Chief Complaint   Patient presents with    Left Ear wax, Home Health Forms       JUAN A William   Comes here with his limited caregiver who is a neighbor.  She helps him to take him to doctors office, appointments and occasionally shopping.  Otherwise patient lives all by himself.  He is  a frail gentleman with history of multiple falls.  He has multiple medical comorbidities with gait instability, unsteady gait, congestive heart failure with low ejection fraction, AICD in situ.  COPD, peripheral neuropathy, and ischemic cardiomyopathy.  Patient is failing to perform his ADLs all by himself.  He would benefit from a motorized wheelchair inside and outside home.  He is bilateral lower extremity is too weak that it does not give him any support rather it gives out and he falls like noodles.  I signed the paper for him to get a motorized wheelchair.  Patient does not have any strength to push self-propel lightweight wheelchair.      MEDICAL DECISION MAKING:  - Current co morbidities have been considered.   - All pertinent labs and images were addressed as per medical necessity.    - Also reviewed relevant notes from the specialty consultants.     - Time spent before, during and after office visit, which includes coordination of care counseling was 34 minutes  - Next follow up : 3 months    TODAY'S VISIT  DX:     1. At risk for fall due to comorbid condition  Will definitely benefit from motorized wheelchair.      2. Seizure disorder (Multi)  meclizine (Antivert) 25 mg tablet    Refilled Depakote      3. Flatulence  dicyclomine (Bentyl) 10 mg capsule      4. Peripheral polyneuropathy  Currently on vitamin B12 supplement      5. Ischemic cardiomyopathy  Low ejection fraction has AICD in psych to      6. Chronic systolic congestive heart failure, NYHA class 2  Frail and weak.  Follows with cardiology.  Currently on high risk medications         Visit Vitals  /64 (BP  "Location: Left arm, Patient Position: Sitting, BP Cuff Size: Adult)   Pulse 69   Temp 36.8 °C (98.2 °F) (Temporal)   Ht 1.702 m (5' 7\")   Wt 68.3 kg (150 lb 9.6 oz)   SpO2 96% Comment: RA   BMI 23.59 kg/m²   Smoking Status Never   BSA 1.8 m²     Wt Readings from Last 10 Encounters:   03/19/25 68.3 kg (150 lb 9.6 oz)   01/14/25 66 kg (145 lb 6.4 oz)   11/12/24 67.6 kg (149 lb)   10/07/24 66.6 kg (146 lb 12.8 oz)   09/25/24 67 kg (147 lb 9.6 oz)   08/06/24 67.6 kg (149 lb)   06/11/24 70.5 kg (155 lb 6.4 oz)   06/04/24 71.7 kg (158 lb)   05/08/24 71.5 kg (157 lb 9.6 oz)   03/26/24 70.8 kg (156 lb)       MEDICATIONS:   Current Outpatient Medications   Medication Instructions    aspirin 81 mg, oral, Daily    atorvastatin (LIPITOR) 80 mg, oral, Nightly    butalbital-acetaminophen-caff -40 mg tablet 1 tablet, Every 4 hours PRN    carvedilol (COREG) 12.5 mg, oral, 2 times daily (morning and late afternoon)    cholecalciferol (VITAMIN D-3) 50,000 Units, oral, Once Weekly    dicyclomine (BENTYL) 10 mg, oral, 4 times daily    digoxin (LANOXIN) 125 mcg, oral, Daily    divalproex (DEPAKOTE) 500 mg, oral, 3 times daily, AFTER MEALS.    docusate sodium (COLACE) 100 mg, oral, Daily PRN    folic acid (FOLVITE) 0.4 mg, Daily    furosemide (LASIX) 20 mg, oral, Every other day    hydrALAZINE (Apresoline) 50 mg tablet Take ( 1) 50 mg tablet twice day one in AM and one at bedtime,  Take (2) 50 mg tablets in afternoon to total 100 mg    latanoprost 0.005 % drops, emulsion 1 drop, Daily    meclizine (ANTIVERT) 25 mg, oral, 3 times daily PRN    mirtazapine (REMERON) 15 mg, Nightly    miscellaneous medical supply (Blood Pressure Cuff) misc 1 each, miscellaneous, Daily, BP to be checked out twice daily.    multivit-min/ferrous fumarate (MULTI VITAMIN ORAL) 1 tablet, Daily    nitroglycerin (NITROSTAT) 0.4 mg, sublingual, Every 5 min PRN    pantoprazole (PROTONIX) 40 mg, oral, Daily    scopolamine (Transderm-Scop) 1 mg over 3 days patch 3 " day 1 patch, Every 72 hours    simethicone (MYLICON) 80 mg, Every 6 hours PRN    SIMETHICONE-80 ORAL 1 tablet, Every 6 hours scheduled       Review of Systems   Constitutional:  Negative for activity change and fever.   HENT:  Negative for hearing loss, nosebleeds and tinnitus.    Eyes:  Negative for redness.   Respiratory:  Negative for chest tightness and stridor.    Cardiovascular:  Negative for chest pain, palpitations and leg swelling.   Gastrointestinal:  Negative for blood in stool.   Endocrine: Negative for cold intolerance.   Genitourinary:  Negative for hematuria.   Musculoskeletal:  Negative for joint swelling.   Skin:  Negative for rash.   Neurological:  Negative for speech difficulty and light-headedness.   Psychiatric/Behavioral:  Negative for behavioral problems.       Physical Exam  Constitutional:       General: She is not in acute distress.     Appearance: Normal appearance.   HENT:      Head: Normocephalic.      Right Ear: Tympanic membrane normal.      Left Ear: Tympanic membrane normal.      Mouth/Throat:      Mouth: Mucous membranes are moist.   Cardiovascular:      Rate and Rhythm: Normal rate and regular rhythm.      Heart sounds: No murmur heard.  Pulmonary:      Effort: No respiratory distress.   Abdominal:      Palpations: Abdomen is soft.   Musculoskeletal:      Cervical back: Neck supple.      Right lower leg: No edema.      Left lower leg: No edema.   Skin:     Findings: No rash.   Neurological:      General: No focal deficit present.      Mental Status: She is alert and oriented to person, place, and time.   Psychiatric:         Mood and Affect: Mood normal.      RECENT LABS:  Lab Results   Component Value Date    WBC 6.1 03/26/2024    HGB 10.6 (L) 03/26/2024    HCT 31.8 (L) 03/26/2024     03/26/2024    ALT 15 03/26/2024    AST 31 03/26/2024     03/26/2024    K 4.3 03/26/2024     03/26/2024    CREATININE 2.06 (H) 03/26/2024    BUN 27 (H) 03/26/2024    CO2 26  03/26/2024    INR 1.1 03/26/2024     Lab Results   Component Value Date    GLUCOSE 95 03/26/2024    CALCIUM 9.3 03/26/2024     03/26/2024    K 4.3 03/26/2024    CO2 26 03/26/2024     03/26/2024    BUN 27 (H) 03/26/2024    CREATININE 2.06 (H) 03/26/2024      Lab Results   Component Value Date    CREATININE 2.06 (H) 03/26/2024         P.S: This note was completed using Dragon voice recognition technology and may include unintended errors with respect to translation of words, typographical errors or grammar errors which may not have been identified while finalizing the chart.

## 2025-03-26 ENCOUNTER — APPOINTMENT (OUTPATIENT)
Dept: CARDIOLOGY | Facility: CLINIC | Age: OVER 89
End: 2025-03-26
Payer: MEDICARE

## 2025-03-26 VITALS — SYSTOLIC BLOOD PRESSURE: 138 MMHG | DIASTOLIC BLOOD PRESSURE: 82 MMHG | HEART RATE: 70 BPM

## 2025-03-26 DIAGNOSIS — D64.9 CHRONIC ANEMIA: ICD-10-CM

## 2025-03-26 DIAGNOSIS — Z91.81 AT HIGH RISK FOR FALLS: ICD-10-CM

## 2025-03-26 DIAGNOSIS — I25.118 CORONARY ARTERY DISEASE OF NATIVE ARTERY OF NATIVE HEART WITH STABLE ANGINA PECTORIS: ICD-10-CM

## 2025-03-26 DIAGNOSIS — Z95.810 ICD (IMPLANTABLE CARDIOVERTER-DEFIBRILLATOR) IN PLACE: ICD-10-CM

## 2025-03-26 DIAGNOSIS — Z53.09 ACEI/ARB CONTRAINDICATED: ICD-10-CM

## 2025-03-26 DIAGNOSIS — N18.31 STAGE 3A CHRONIC KIDNEY DISEASE (MULTI): ICD-10-CM

## 2025-03-26 DIAGNOSIS — E78.2 MIXED HYPERLIPIDEMIA: ICD-10-CM

## 2025-03-26 DIAGNOSIS — I50.22 CHRONIC SYSTOLIC CONGESTIVE HEART FAILURE, NYHA CLASS 2: ICD-10-CM

## 2025-03-26 PROCEDURE — 99214 OFFICE O/P EST MOD 30 MIN: CPT | Performed by: INTERNAL MEDICINE

## 2025-03-26 PROCEDURE — 3079F DIAST BP 80-89 MM HG: CPT | Performed by: INTERNAL MEDICINE

## 2025-03-26 PROCEDURE — 1123F ACP DISCUSS/DSCN MKR DOCD: CPT | Performed by: INTERNAL MEDICINE

## 2025-03-26 PROCEDURE — 1036F TOBACCO NON-USER: CPT | Performed by: INTERNAL MEDICINE

## 2025-03-26 PROCEDURE — 1159F MED LIST DOCD IN RCRD: CPT | Performed by: INTERNAL MEDICINE

## 2025-03-26 PROCEDURE — 1157F ADVNC CARE PLAN IN RCRD: CPT | Performed by: INTERNAL MEDICINE

## 2025-03-26 PROCEDURE — 1160F RVW MEDS BY RX/DR IN RCRD: CPT | Performed by: INTERNAL MEDICINE

## 2025-03-26 PROCEDURE — 3075F SYST BP GE 130 - 139MM HG: CPT | Performed by: INTERNAL MEDICINE

## 2025-03-26 PROCEDURE — G2211 COMPLEX E/M VISIT ADD ON: HCPCS | Performed by: INTERNAL MEDICINE

## 2025-03-26 RX ORDER — FUROSEMIDE 20 MG/1
20 TABLET ORAL EVERY OTHER DAY
Qty: 45 TABLET | Refills: 2 | Status: SHIPPED | OUTPATIENT
Start: 2025-03-26

## 2025-03-26 NOTE — PATIENT INSTRUCTIONS
Please bring all medicines, vitamins, and herbal supplements with you in original bottles to every appointment!!!!    Prescriptions will not be filled unless you are compliant with your follow up appointments or have a follow up appointment scheduled as per instruction of your physician. Refills should be requested at the time of your visit.     FASTING LABS, FASTING FROM MIDNIGHT THE NIGHT BEFORE TO BE DONE 1 WEEK PRIOR TO YOUR APPOINTMENT WITH DR PORTILLO

## 2025-03-26 NOTE — PROGRESS NOTES
Chief Complaint:    Chief Complaint   Patient presents with    Follow-up     6 month follow up for ICD, CHF, CAD.       Subjective : Does not report any chest pressure tightness heaviness palpitations lightheadedness presyncope or syncope.  Overall, he is feeling good.  He has support at home, for his day-to-day needs.  He has a ride that can bring him to appointments.  He is more prayerful.  He had a mechanical fall and was seen in the emergency department in March 2025.  No major injury  He complains of epigastric discomfort, this is a chronic symptom.  Has history of periodic visits to the emergency department with different types of chest pain, most recently seen at [facility] in March 2025 and I have reviewed notes.  There was mild elevation of troponin in the setting of chronic kidney disease, nondiagnostic, his pain was very atypical, there was epigastric and abdominal discomfort, CT PE protocol was negative.    History so Far :  1. Congestive heart failure functional class II/III  2.. Cardiac catheterization 11/2018 LVEDP 10 mmHg LVEF 40% left main 0% stenosis LAD 20% within previous stent in the distal vessel, mid LAD stent 20% stenosis, left circumflex 30% stenosis RCA less than 20% proximal and distal stenosis.  3. Has tendency for hyperkalemia, have to watch closely. Hyperkalemia was the reason for discontinuation of Entresto  4. Chronic kidney disease stage III  5. Tendency for orthostatic hypotension. Not orthostatic today, blood pressure is at target, patient feels well.  6. Degenerative joint disease, ambulates with a wheeled walker.  7. Increased frequency of urination, partly related to BPH  8. Echocardiogram March 3, 2021 LVEF 40 to 45%, normal RV size and function, trivial mitral regurgitation 1+ tricuspid regurgitation RVSP 30 mmHg trivial tricuspid regurgitation  9. Biventricular ICD, device interrogation April 2021 showed less than 1% atrial fibrillation burden longest duration 21  hours, this is addressed by EP service  10. Elevated D-dimer 0.7 6 June 2021-CT chest negative for pulmonary embolism  11. Status post injection right rotator cuff for pain.  12. Carotid ultrasound 2018 less than 50% bilateral carotid stenosis  12. Lexiscan Myoview March 2021 no ischemia LVEF 39%  13. chronically elevated troponin  14. seizure disorder   15. Orthostatic hypotension  16. ACE inhibitor/angiotensin receptor blocker/Entresto intolerance due to hyperkalemia  17. BPH  18. Frequent ER visits, for multitude of reasons, lately for accelerated hypertension, chronic dizziness, and now with complaints of chest pain and jaw discomfort. Did not try nitroglycerin but took Tylenol. Very difficult to assess clinically.  19. Agree with the fact that anxiety and/or depression are triggering several of the ER visits.  20. Lexiscan Myoview October 2022-no evidence of ischemia or infarction, LVEF 54%, LVEF was reported to be 39% in March 2021 transient ischemic dilatation 1.0 which is normal.   21.  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  CKD stage IIIb  23.  Echocardiogram March 2021-LVEF 40 to 45%, 1+ tricuspid regurgitation, RVSP 30 mmHg, trivial pulmonary regurgitation, minimal aortic leaflet calcification no evidence of aortic regurgitation peak and mean gradients 8 and 5 respectively  24.  Echocardiogram 12/15/2023-Limited study calcified aortic valve cusps stenotic cyst not assessed due to limited exam, LVEF 60 to 65% reduced RV systolic function mitral annular calcification, mildly dilated left atrium is a limited echo and so aortic valve was not assessed.  Evidence of diastolic dysfunction.      25. 5/29/2024 Nuclear stress   Normal [employer] cardiac perfusion stress test.  No evidence of ischemia or myocardial infarction by  perfusion imaging.  Normal left ventricular systolic function, ejection fraction 61%.    26.  CT chest PE protocol March 2025-no pulmonary embolism no findings of congestive heart failure or pneumonia minimal atelectasis right lung base posteriorly         Objective   Wt Readings from Last 3 Encounters:   03/19/25 68.3 kg (150 lb 9.6 oz)   01/14/25 66 kg (145 lb 6.4 oz)   11/12/24 67.6 kg (149 lb)        Vitals:    03/26/25 1011   BP: 138/82   BP Location: Left arm   Patient Position: Sitting   Pulse: 70           Physical Exam:  GENERAL APPEARANCE: in no acute distress.  CHEST: Symmetric and non-tender.  Device generator right infraclavicular area without tenderness or swelling  INTEGUMENT: Skin warm and dry  HEENT: No gross abnormalities identified.No pallor or scleral icterus.  NECK: Supple, no JVD, no bruit.   NEURO/PSHCY: Alert and oriented x3; appropriate behavior and responses and responses  LUNGS: Clear to auscultation bilaterally; normal respiratory effort.  Breath sounds are distant  HEART: Rate and rhythm regular with no evident murmur; no gallop appreciated.   ABDOMEN: Soft, non tender.  MUSCULOSKELETAL: Left arm abduction 60 degrees examined in the wheelchair, has chronic lower extremity weakness and difficulty with coordination.  EXTREMITIES: Warm  There is no edema noted.    Meds:  Current Outpatient Medications   Medication Instructions    aspirin 81 mg, oral, Daily    atorvastatin (LIPITOR) 80 mg, oral, Nightly    butalbital-acetaminophen-caff -40 mg tablet 1 tablet, Every 4 hours PRN    carvedilol (COREG) 12.5 mg, oral, 2 times daily (morning and late afternoon)    cholecalciferol (VITAMIN D-3) 50,000 Units, oral, Once Weekly    dicyclomine (BENTYL) 10 mg, oral, 4 times daily    digoxin (LANOXIN) 125 mcg, oral, Daily    divalproex (DEPAKOTE) 500 mg, oral, 3 times daily, AFTER MEALS.    docusate sodium (COLACE) 100 mg, oral, Daily PRN    folic acid (FOLVITE) 0.4 mg, Daily    furosemide (LASIX)  20 mg, oral, Every other day    hydrALAZINE (Apresoline) 50 mg tablet Take ( 1) 50 mg tablet twice day one in AM and one at bedtime,  Take (2) 50 mg tablets in afternoon to total 100 mg    latanoprost 0.005 % drops, emulsion 1 drop, Daily    meclizine (ANTIVERT) 25 mg, oral, 3 times daily PRN    mirtazapine (REMERON) 15 mg, Nightly    miscellaneous medical supply (Blood Pressure Cuff) misc 1 each, miscellaneous, Daily, BP to be checked out twice daily.    multivit-min/ferrous fumarate (MULTI VITAMIN ORAL) 1 tablet, Daily    nitroglycerin (NITROSTAT) 0.4 mg, sublingual, Every 5 min PRN    pantoprazole (PROTONIX) 40 mg, oral, Daily    scopolamine (Transderm-Scop) 1 mg over 3 days patch 3 day 1 patch, Every 72 hours    simethicone (MYLICON) 80 mg, Every 6 hours PRN    SIMETHICONE-80 ORAL 1 tablet, Every 6 hours scheduled          Allergies   Allergen Reactions    Codeine Unknown    Lisinopril Other and Unknown     Hyperkalemia    Penicillins Unknown    Doxycycline Dizziness and Hallucinations     THINGS MOVING IN ROOM, CELLING COMING DOWN          LABS:      Testing Reviewed  Labs  CBC:   Lab Results   Component Value Date    WBC 6.1 03/26/2024    RBC 2.96 (L) 03/26/2024    HGB 10.6 (L) 03/26/2024    HCT 31.8 (L) 03/26/2024     03/26/2024        CMP:    Lab Results   Component Value Date     03/26/2024    K 4.3 03/26/2024     03/26/2024    CO2 26 03/26/2024    BUN 27 (H) 03/26/2024    CREATININE 2.06 (H) 03/26/2024    GLUCOSE 95 03/26/2024    CALCIUM 9.3 03/26/2024         Lab Results   Component Value Date    MG 2.04 03/26/2024 December 2024-sodium 134 potassium 4.1 BUN 21 creatinine 1.58 GFR 41 TSH March 2025 3.8 hemoglobin 10.4 hematocrit 30 .9 platelets 2 41,000 NT proBNP level 891 March 2025 this is within normal limits for his age GFR 44.5 creatinine 1.47 liver enzymes normal lipase normal    Reviewed all available pertinent laboratory data and diagnostic testing results that  occurred after the last office visit with me                Assessment:    1. Coronary artery disease of native artery of native heart with stable angina pectoris  Follow Up In Cardiology    Follow Up In Cardiology    Comprehensive metabolic panel    Lipid panel    Digoxin level    Comprehensive metabolic panel    Lipid panel    Digoxin level      2. ICD (implantable cardioverter-defibrillator) in place  Follow Up In Cardiology    furosemide (Lasix) 20 mg tablet    Follow Up In Cardiology    Comprehensive metabolic panel    Lipid panel    Digoxin level    Comprehensive metabolic panel    Lipid panel    Digoxin level      3. Chronic systolic congestive heart failure, NYHA class 2  Follow Up In Cardiology    furosemide (Lasix) 20 mg tablet    Follow Up In Cardiology    Comprehensive metabolic panel    Lipid panel    Digoxin level    Comprehensive metabolic panel    Lipid panel    Digoxin level      4. Stage 3a chronic kidney disease (Multi)  Follow Up In Cardiology    furosemide (Lasix) 20 mg tablet    Follow Up In Cardiology    Comprehensive metabolic panel    Lipid panel    Digoxin level    Comprehensive metabolic panel    Lipid panel    Digoxin level      5. ACEI/ARB contraindicated  Follow Up In Cardiology    Comprehensive metabolic panel    Lipid panel    Digoxin level    Comprehensive metabolic panel    Lipid panel    Digoxin level      6. Chronic anemia  Follow Up In Cardiology    Comprehensive metabolic panel    Lipid panel    Digoxin level    Comprehensive metabolic panel    Lipid panel    Digoxin level      7. Mixed hyperlipidemia  Follow Up In Cardiology    Comprehensive metabolic panel    Lipid panel    Digoxin level    Comprehensive metabolic panel    Lipid panel    Digoxin level      8. At high risk for falls  Follow Up In Cardiology    Comprehensive metabolic panel    Lipid panel    Digoxin level    Comprehensive metabolic panel    Lipid panel    Digoxin level           Clinical Decision  Making:  Multiple comorbidities as noted above  Patient is not volume overloaded, and has no symptoms pertaining to his chronic systolic heart failure.  He has CKD, and hyperkalemia tendency, hence he is not a candidate for ACE inhibitors angiotensin receptor blockers or Aldactone.    I did not make any change in his cardiac medications today.  Comanaged by EP, and I have reviewed EP notes from March 2024.  Reviewed notes by primary MD Dr. Jain dated 3/19/2025.    Follow up : 6 months  Comprehensive profile lipid profile and digoxin level before next visit    I,Lidya Triplett MA  am scribing for, and in the presence of Dr. Susannah Dillon MD, FACC.       I, Dr. Susannah Dillon MD, FACC, personally performed the services described in the documentation as scribed by Lidya Triplett MA    in my presence, and confirm it is both accurate and complete.

## 2025-03-27 ENCOUNTER — TELEPHONE (OUTPATIENT)
Dept: PRIMARY CARE | Facility: CLINIC | Age: OVER 89
End: 2025-03-27
Payer: MEDICARE

## 2025-03-27 NOTE — TELEPHONE ENCOUNTER
Received vmm from Radha Chatterjee [facility] requesting addendum to OV notes to include need for motorized wheelchair. Fax [phone]. Reviewed 3/19/25 office notes. OV notes faxed as requested

## 2025-03-31 DIAGNOSIS — K21.9 GASTROESOPHAGEAL REFLUX DISEASE, UNSPECIFIED WHETHER ESOPHAGITIS PRESENT: ICD-10-CM

## 2025-03-31 RX ORDER — PANTOPRAZOLE SODIUM 40 MG/1
40 TABLET, DELAYED RELEASE ORAL DAILY
Qty: 90 TABLET | Refills: 3 | Status: SHIPPED | OUTPATIENT
Start: 2025-03-31

## 2025-03-31 NOTE — TELEPHONE ENCOUNTER
Patient friend called the office today requesting medication refill. Rx pended    Last OV: 03/19/2025    Pending OV: 04/16/2025

## 2025-04-16 ENCOUNTER — APPOINTMENT (OUTPATIENT)
Dept: PRIMARY CARE | Facility: CLINIC | Age: OVER 89
End: 2025-04-16
Payer: MEDICARE

## 2025-04-24 PROCEDURE — 93295 DEV INTERROG REMOTE 1/2/MLT: CPT | Performed by: INTERNAL MEDICINE

## 2025-04-28 DIAGNOSIS — N18.31 STAGE 3A CHRONIC KIDNEY DISEASE (MULTI): ICD-10-CM

## 2025-04-28 DIAGNOSIS — R09.89 LABILE HYPERTENSION: ICD-10-CM

## 2025-04-28 DIAGNOSIS — Z95.810 ICD (IMPLANTABLE CARDIOVERTER-DEFIBRILLATOR) IN PLACE: ICD-10-CM

## 2025-04-28 DIAGNOSIS — I25.5 ISCHEMIC CARDIOMYOPATHY: ICD-10-CM

## 2025-04-28 DIAGNOSIS — I50.22 CHRONIC SYSTOLIC CONGESTIVE HEART FAILURE, NYHA CLASS 2: ICD-10-CM

## 2025-04-28 DIAGNOSIS — I10 PRIMARY HYPERTENSION: ICD-10-CM

## 2025-04-28 RX ORDER — ATORVASTATIN CALCIUM 80 MG/1
80 TABLET, FILM COATED ORAL NIGHTLY
Qty: 90 TABLET | Refills: 1 | Status: SHIPPED | OUTPATIENT
Start: 2025-04-28 | End: 2025-10-25

## 2025-05-14 ENCOUNTER — APPOINTMENT (OUTPATIENT)
Dept: PRIMARY CARE | Facility: CLINIC | Age: OVER 89
End: 2025-05-14
Payer: MEDICARE

## 2025-05-14 VITALS
BODY MASS INDEX: 23.01 KG/M2 | TEMPERATURE: 98.2 F | HEART RATE: 70 BPM | RESPIRATION RATE: 18 BRPM | DIASTOLIC BLOOD PRESSURE: 56 MMHG | WEIGHT: 146.6 LBS | OXYGEN SATURATION: 98 % | HEIGHT: 67 IN | SYSTOLIC BLOOD PRESSURE: 122 MMHG

## 2025-05-14 DIAGNOSIS — I48.0 PAROXYSMAL ATRIAL FIBRILLATION (MULTI): ICD-10-CM

## 2025-05-14 DIAGNOSIS — N18.4 CHRONIC RENAL DISEASE, STAGE IV (MULTI): ICD-10-CM

## 2025-05-14 DIAGNOSIS — K21.00 GASTROESOPHAGEAL REFLUX DISEASE WITH ESOPHAGITIS WITHOUT HEMORRHAGE: ICD-10-CM

## 2025-05-14 DIAGNOSIS — Z00.00 ENCOUNTER FOR SUBSEQUENT ANNUAL WELLNESS VISIT (AWV) IN MEDICARE PATIENT: Primary | ICD-10-CM

## 2025-05-14 PROBLEM — N18.31 STAGE 3A CHRONIC KIDNEY DISEASE (MULTI): Status: RESOLVED | Noted: 2023-10-01 | Resolved: 2025-05-14

## 2025-05-14 PROBLEM — N18.32 CHRONIC KIDNEY DISEASE, STAGE 3B (MULTI): Status: RESOLVED | Noted: 2025-01-14 | Resolved: 2025-05-14

## 2025-05-14 PROBLEM — Z09 HOSPITAL DISCHARGE FOLLOW-UP: Status: RESOLVED | Noted: 2023-12-30 | Resolved: 2025-05-14

## 2025-05-14 PROBLEM — N18.2 CHRONIC KIDNEY DISEASE, STAGE 2 (MILD): Status: RESOLVED | Noted: 2023-10-01 | Resolved: 2025-05-14

## 2025-05-14 PROBLEM — R09.89 LABILE HYPERTENSION: Status: RESOLVED | Noted: 2023-10-01 | Resolved: 2025-05-14

## 2025-05-14 RX ORDER — ONDANSETRON 4 MG/1
4 TABLET, FILM COATED ORAL EVERY 8 HOURS PRN
COMMUNITY

## 2025-05-14 ASSESSMENT — ACTIVITIES OF DAILY LIVING (ADL)
DOING_HOUSEWORK: NEEDS ASSISTANCE
DRESSING: INDEPENDENT
GROCERY_SHOPPING: NEEDS ASSISTANCE
TAKING_MEDICATION: NEEDS ASSISTANCE
BATHING: INDEPENDENT
MANAGING_FINANCES: NEEDS ASSISTANCE

## 2025-05-15 PROBLEM — J81.0 ACUTE PULMONARY EDEMA: Status: RESOLVED | Noted: 2024-06-11 | Resolved: 2025-05-15
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
## 2025-05-27 ENCOUNTER — APPOINTMENT (OUTPATIENT)
Dept: PRIMARY CARE | Facility: CLINIC | Age: OVER 89
End: 2025-05-27
Payer: MEDICARE

## 2025-06-16 ENCOUNTER — TELEPHONE (OUTPATIENT)
Dept: PRIMARY CARE | Facility: CLINIC | Age: OVER 89
End: 2025-06-16

## 2025-06-16 ENCOUNTER — APPOINTMENT (OUTPATIENT)
Dept: CARDIOLOGY | Facility: CLINIC | Age: OVER 89
End: 2025-06-16
Payer: MEDICARE

## 2025-06-16 ENCOUNTER — APPOINTMENT (OUTPATIENT)
Dept: PRIMARY CARE | Facility: CLINIC | Age: OVER 89
End: 2025-06-16
Payer: MEDICARE

## 2025-06-16 VITALS — DIASTOLIC BLOOD PRESSURE: 48 MMHG | SYSTOLIC BLOOD PRESSURE: 88 MMHG

## 2025-06-16 DIAGNOSIS — I25.118 CORONARY ARTERY DISEASE OF NATIVE ARTERY OF NATIVE HEART WITH STABLE ANGINA PECTORIS: ICD-10-CM

## 2025-06-16 DIAGNOSIS — Z91.81 AT HIGH RISK FOR FALLS: ICD-10-CM

## 2025-06-16 DIAGNOSIS — Z78.9 NEVER SMOKED TOBACCO: ICD-10-CM

## 2025-06-16 DIAGNOSIS — I50.22 CHRONIC SYSTOLIC CONGESTIVE HEART FAILURE, NYHA CLASS 2: ICD-10-CM

## 2025-06-16 DIAGNOSIS — Z95.810 ICD (IMPLANTABLE CARDIOVERTER-DEFIBRILLATOR) IN PLACE: Primary | ICD-10-CM

## 2025-06-16 DIAGNOSIS — E78.2 MIXED HYPERLIPIDEMIA: ICD-10-CM

## 2025-06-16 DIAGNOSIS — I10 PRIMARY HYPERTENSION: ICD-10-CM

## 2025-06-16 PROCEDURE — 3078F DIAST BP <80 MM HG: CPT | Performed by: INTERNAL MEDICINE

## 2025-06-16 PROCEDURE — 1159F MED LIST DOCD IN RCRD: CPT | Performed by: INTERNAL MEDICINE

## 2025-06-16 PROCEDURE — 1036F TOBACCO NON-USER: CPT | Performed by: INTERNAL MEDICINE

## 2025-06-16 PROCEDURE — 99214 OFFICE O/P EST MOD 30 MIN: CPT | Performed by: INTERNAL MEDICINE

## 2025-06-16 PROCEDURE — 3074F SYST BP LT 130 MM HG: CPT | Performed by: INTERNAL MEDICINE

## 2025-06-16 NOTE — TELEPHONE ENCOUNTER
Nicolasa otto Care Transition Coordinator called the office today advising that patient was discharged from the hospital on 06/13. She is requesting verbal orders from  to see if he will follow patient for skilled nursing, PT, OT and medical social worker. Please advise

## 2025-06-16 NOTE — PROGRESS NOTES
CARDIOLOGY OFFICE VISIT      CHIEF COMPLAINT  Chief Complaint   Patient presents with    Follow-up     Presents to the office for an abnormal device check.        HISTORY OF PRESENT ILLNESS  HPI  89-year-old -American male who is followed for complete heart block status post dual-chamber pacemaker implant on March 6, 2015 and upgrade to an AV biventricular ICD on August 24, 2017 for the treatment of refractory heart failure and primary prevention of sudden cardiac death. He underwent ICD generator change out on September 26, 2023 for CANDI parameters.    Admitted to [facility] 12/2024. He presented out of concern for weakness and frequent falls. XR and CT scans were negative for any fractures in ED with full results documented below. PT/OT recommended SNF to which patient was agreeable. He was kept overnight for fevers and was briefly starting on empiric IV abx as infectious workup was pending. UA, CXR, Covid, Flu, and blood cultures all returned negative. He did not have a leukocytosis or develop tachycardia this admit. He denied any cough, headache, diarrhea, dysuria, dizziness, or lightheadedness. There was some chronic maxillary sinusitis on his CT imaging of his head which may be the ultimate source of fever, for which patient will discharge on course of doxycycline      ED evaluation 06/2025. chief complaint of nausea vomiting generalized weakness and fatigue. Symptoms began today. Vomiting and several hours to arrival. He does have a secondary complaint of chest pressure which has been ongoing since he started vomiting. He denies any melena, hematochezia, hematemesis. Friend at bedside states that patient is about to be removed from his current home and they have been visiting local [facility] facilities for the past couple days with his family members. He does have a longstanding history of CAD, cardiomyopathy, HFrEF, atrial fibrillation, CKD. Friend at bedside states that he has been so weak today that he  has been barely able to ambulate around his house even with assistance. he generally ambulates with a cane or walker and is very independent. Denies headache, dysuria, neck pain stifness.     He is hemodynamically stable though febrile at time of arrival. He is without features of meningitis. EKG demonstrated atrial sensed ventricular paced rhythm He was given very cautious IV fluid hydration given his known HFrEF and CKD. Zofran for nausea. Tylenol for presenting fever. Chest x-ray without acute cardiopulmonary process. Lab work demonstrating stable CKD, leukocytosis with redemonstration of macrocytic anemia, stable minimal elevation in troponin in the setting of CKD. Normal serum lactate. Upon reevaluation he was complaining of lower quadrant abdominal pain CT of the abdomen and pelvis obtained and negative for acute intra-abdominal pathology with prostatic megaly and questionable TURP postop changes noted no other acute intra-abdominal pathology to explain patient's presenting symptoms and fever. Stated belly pain resolved upon return for CT without intervention. He did spontaneously provide a urine sample which was negative for infectious process. He was given IV Rocephin empirically given presenting fever leukocytosis of unclear source.    Patient said that so far he has been doing well.  Denies any symptoms like chest pain or shortness with or palpitations.    Device interrogation in June 2025 shows [employer] medical biventricular ICD with battery Inge[employer] 4.3 years.  [place] of atrial fusion 3% of the time with the longest duration of 13 hours.  99% BiV paced.          Past Medical History  Medical History[1]    Social History  Social History[2]    Family History   Family History[3]     Allergies:  RX Allergies[4]     Outpatient Medications:  Current Outpatient Medications   Medication Instructions    aspirin 81 mg, oral, Daily    atorvastatin (LIPITOR) 80 mg, oral, Nightly    butalbital-acetaminophen-caff  -40 mg tablet 1 tablet, Every 4 hours PRN    carvedilol (COREG) 12.5 mg, oral, 2 times daily (morning and late afternoon)    cholecalciferol (VITAMIN D-3) 50,000 Units, oral, Once Weekly    dicyclomine (BENTYL) 10 mg, oral, 4 times daily    digoxin (LANOXIN) 125 mcg, oral, Daily    divalproex (DEPAKOTE) 500 mg, oral, 3 times daily, AFTER MEALS.    docusate sodium (COLACE) 100 mg, oral, Daily PRN    folic acid (FOLVITE) 0.4 mg, Daily    furosemide (LASIX) 20 mg, oral, Every other day    hydrALAZINE (Apresoline) 50 mg tablet Take ( 1) 50 mg tablet twice day one in AM and one at bedtime,  Take (2) 50 mg tablets in afternoon to total 100 mg    latanoprost 0.005 % drops, emulsion 1 drop, Daily    meclizine (ANTIVERT) 25 mg, oral, 3 times daily PRN    miscellaneous medical supply (Blood Pressure Cuff) misc 1 each, miscellaneous, Daily, BP to be checked out twice daily.    multivit-min/ferrous fumarate (MULTI VITAMIN ORAL) 1 tablet, Daily    nitroglycerin (NITROSTAT) 0.4 mg, sublingual, Every 5 min PRN    ondansetron (Zofran) 4 mg tablet 4 tablets, Every 8 hours PRN    pantoprazole (PROTONIX) 40 mg, oral, Daily          REVIEW OF SYSTEMS  ROS      VITALS  Vitals:    06/16/25 1134   BP: (!) 88/48       PHYSICAL EXAM  Constitutional:       Appearance: Healthy appearance. Not in distress.   Neck:      Vascular: No JVR. JVD normal.   Pulmonary:      Effort: Pulmonary effort is normal.      Breath sounds: Normal breath sounds. No wheezing. No rhonchi. No rales.   Chest:      Chest wall: Not tender to palpatation.   Cardiovascular:      PMI at left midclavicular line. Normal rate. Regular rhythm. Normal S1. Normal S2.       Murmurs: There is a grade 3/6 systolic murmur.      No gallop.  No click. No rub.      Comments: Device right pectoral area. No hematoma or infection noted.     Pulses:     Intact distal pulses.   Edema:     Peripheral edema absent.   Abdominal:      General: Bowel sounds are normal.      Palpations:  Abdomen is soft.      Tenderness: There is no abdominal tenderness.   Musculoskeletal: Normal range of motion.         General: No tenderness. Skin:     General: Skin is warm and dry.   Neurological:      General: No focal deficit present.      Mental Status: Alert and oriented to person, place and time.           ASSESSMENT AND PLAN      Clinical impressions:  1. Complete heart block status post dual-chamber pacemaker implant on March 6, 2015 and upgrade to an AV biventricular ICD ([employer] Quadra Assura CRT-D SD) on August 24, 2017 currently at elective replacement indicator per device interrogation dated August 25, 2023.  2. Ischemic cardiomyopathy with a left ventricular ejection fraction improved to normal per 2D echocardiogram dated March 21, 2024 New York Heart Association class II, stage C heart failure.  3. Coronary artery disease with left heart catheterization dated November 13, 2018 revealing 20% mid LAD with patent stent, 30% mid circumflex, 20% proximal, mid, and distal RCA stenosis with recommendation for medical management.  4. Paroxysmal atrial fibrillation controlled on beta-blockade and presently not anticoagulated due to low arrhythmic burden.  5. Dyslipidemia on statin.  6. Anxiety disorder.  7. Hypertension controlled    Plan recommendation    From the electrophysiology standpoint he is stable we will continue with current medical therapy that includes digoxin, beta-blocker therapy.    [place] of atrial fibrillation was still minimal.  Will hold anticoagulation for now.    Follow device clinic as scheduled    Follow my office every 6 months or sooner if needed.    Patient was instructed to check his blood pressure twice a day and bring the blood pressure readings to his next appointment with cardiology service.    Risk factor modification and lifestyle modification discussed with patient. Diet , exercise and hydration discussed with patient.    I have personally review with patient during this  office visit, laboratory data, echocardiogram results, stress test results, Holter-event monitor results prior and after the last electrophysiology visit. All questions has been answered.    Please excuse any errors in grammar or translation related to this dictation.  Voice recognition software was utilized to prepare this document.      I, Dr. Rivera, personally performed the services described in the documentation as scribed by the nurse in my presence, and confirm it is both accurate and complete.     Scribe Attestation  By signing my name below, I, Chiara Walter RN  , Scribe attest that this documentation has been prepared under the direction and in the presence of Michael Rivera MD.          [1]   Past Medical History:  Diagnosis Date    Encounter for follow-up examination after completed treatment for conditions other than malignant neoplasm 07/11/2022    Hospital discharge follow-up    Encounter for follow-up examination after completed treatment for conditions other than malignant neoplasm 05/04/2022    Hospital discharge follow-up    Hypertension     ICD (implantable cardioverter-defibrillator) in place     Insomnia disorder     Personal history of other diseases of the circulatory system 12/07/2022    History of hypertension    Personal history of other endocrine, nutritional and metabolic disease 12/07/2022    History of hyperlipidemia   [2]   Social History  Tobacco Use    Smoking status: Never    Smokeless tobacco: Never   Vaping Use    Vaping status: Never Used   Substance Use Topics    Alcohol use: Not Currently    Drug use: Never   [3]   Family History  Problem Relation Name Age of Onset    No Known Problems Mother      Cancer Father      Other (cardiac disorder) Sister     [4]   Allergies  Allergen Reactions    Codeine Unknown    Lisinopril Other and Unknown     Hyperkalemia    Penicillins Unknown    Doxycycline Dizziness and Hallucinations     THINGS MOVING IN ROOM, CELLING COMING DOWN

## 2025-06-16 NOTE — PATIENT INSTRUCTIONS
6 month follow up with Dr. Rivera  Check your blood pressure twice daily. Once in the morning 1-2 hours after morning medication and again at bedtime. Please keep a blood pressure and heart rate diary and bring to appointment with Lizzie    Keep all device check appointments with [facility] next in clinic appointment September    DID YOU KNOW  We have a pharmacy here in the [facility].  They can fill all prescriptions, not just cardiac medications.  Prescriptions from other pharmacies can easily be transferred to the  pharmacy by the  pharmacist on site.   pharmacies offer FREE HOME DELIVERY on medications to anywhere in [place]. They can sync your medications. Typically prescriptions can be ready in 10 - 15 minutes. If pharmacy is unable to fill your  prescription or if cost is more than your paying now the Pharmacist can easily transfer back to your Pharmacy of choice. Pharmacy phone # [phone].   Please bring all medicines, vitamins, and herbal supplements with you in original bottles to every appointment  Prescriptions will not be filled unless you are compliant with your follow up appointments or have a follow up appointment scheduled as per instruction of your physician. Refills should be requested at the time of your visit.

## 2025-06-17 NOTE — TELEPHONE ENCOUNTER
Beth fields with [facility] is calling in to request a verbal that M will follow patients home care.     Can call Nicolasa @ [phone] to give verbal

## 2025-07-14 ENCOUNTER — APPOINTMENT (OUTPATIENT)
Dept: CARDIOLOGY | Facility: CLINIC | Age: OVER 89
End: 2025-07-14
Payer: MEDICARE

## 2025-07-14 VITALS
HEIGHT: 67 IN | BODY MASS INDEX: 22.91 KG/M2 | HEART RATE: 68 BPM | SYSTOLIC BLOOD PRESSURE: 98 MMHG | DIASTOLIC BLOOD PRESSURE: 52 MMHG | WEIGHT: 146 LBS

## 2025-07-14 DIAGNOSIS — E78.2 MIXED HYPERLIPIDEMIA: ICD-10-CM

## 2025-07-14 DIAGNOSIS — R09.89 LABILE HYPERTENSION: ICD-10-CM

## 2025-07-14 DIAGNOSIS — N18.31 STAGE 3A CHRONIC KIDNEY DISEASE (MULTI): ICD-10-CM

## 2025-07-14 DIAGNOSIS — I25.118 CORONARY ARTERY DISEASE OF NATIVE ARTERY OF NATIVE HEART WITH STABLE ANGINA PECTORIS: ICD-10-CM

## 2025-07-14 DIAGNOSIS — I48.0 PAROXYSMAL ATRIAL FIBRILLATION (MULTI): ICD-10-CM

## 2025-07-14 DIAGNOSIS — I25.5 ISCHEMIC CARDIOMYOPATHY: ICD-10-CM

## 2025-07-14 DIAGNOSIS — Z91.81 AT HIGH RISK FOR FALLS: ICD-10-CM

## 2025-07-14 DIAGNOSIS — Z95.810 ICD (IMPLANTABLE CARDIOVERTER-DEFIBRILLATOR) IN PLACE: ICD-10-CM

## 2025-07-14 DIAGNOSIS — I50.22 CHRONIC SYSTOLIC CONGESTIVE HEART FAILURE, NYHA CLASS 2: ICD-10-CM

## 2025-07-14 DIAGNOSIS — I95.9 HYPOTENSION, UNSPECIFIED HYPOTENSION TYPE: Primary | ICD-10-CM

## 2025-07-14 DIAGNOSIS — I10 PRIMARY HYPERTENSION: ICD-10-CM

## 2025-07-14 DIAGNOSIS — Z78.9 NEVER SMOKED TOBACCO: ICD-10-CM

## 2025-07-14 PROCEDURE — 3074F SYST BP LT 130 MM HG: CPT | Performed by: NURSE PRACTITIONER

## 2025-07-14 PROCEDURE — 1160F RVW MEDS BY RX/DR IN RCRD: CPT | Performed by: NURSE PRACTITIONER

## 2025-07-14 PROCEDURE — 3078F DIAST BP <80 MM HG: CPT | Performed by: NURSE PRACTITIONER

## 2025-07-14 PROCEDURE — 1159F MED LIST DOCD IN RCRD: CPT | Performed by: NURSE PRACTITIONER

## 2025-07-14 PROCEDURE — 99214 OFFICE O/P EST MOD 30 MIN: CPT | Performed by: NURSE PRACTITIONER

## 2025-07-14 PROCEDURE — 1036F TOBACCO NON-USER: CPT | Performed by: NURSE PRACTITIONER

## 2025-07-14 RX ORDER — HYDRALAZINE HYDROCHLORIDE 50 MG/1
TABLET, FILM COATED ORAL
Qty: 270 TABLET | Refills: 3 | Status: SHIPPED | OUTPATIENT
Start: 2025-07-14

## 2025-07-14 RX ORDER — FUROSEMIDE 20 MG/1
20 TABLET ORAL
Qty: 45 TABLET | Refills: 2 | Status: SHIPPED | OUTPATIENT
Start: 2025-07-14

## 2025-07-14 RX ORDER — DIGOXIN 125 MCG
125 TABLET ORAL EVERY OTHER DAY
Qty: 45 TABLET | Refills: 3 | Status: SHIPPED | OUTPATIENT
Start: 2025-07-14 | End: 2026-07-14

## 2025-07-14 NOTE — PATIENT INSTRUCTIONS
Decrease your Apresoline 50 mg 1 tablet three times a day    Decrease your lasix (furosemide) dose to 20 mg 1 tablet three days per week Mon-Wed-Fri    Your digoxin dose had been decreased to Every Other Day, please make sure you are taking as directed.    Please try to increase your water intake, you should try to drink 6 - 8 glasses/bottles of water per day to stay well hydrated.      Have labs drawn prior to next office visit with Dr. Dillon

## 2025-07-14 NOTE — PROGRESS NOTES
"Chief Complaint:  Chief Complaint   Patient presents with    Follow-up     \"Check up\"         JUAN A William is a 89 y.o. male that presents to the office today with a friend/neighbor for cardiac follow-up. He follows with his  primary cardiologist Dr. Dillon and Dr. Rivera,  he was added to my schedule today.  PMH as noted below.    He was last seen in office with Dr. Rivera 6/16/2025 where his blood pressure was noted to be low 88/48. He was advised to monitor his blood pressure and follow with general cardiology.     He reports that he does not feel well.  He reports he recently fell in his driveway stating his legs became weak and he fell and was unable to get himself up.  He was helped up by a neighbor at that time.  He reports his appetite is very poor and he is not eating much lately and only consuming approximately 1 glass of water per day.  He states he feels weak.  He also reports he is depressed as he is in the process of moving from his home to a senior living apartment.  He and his friend/neighbor state that he was previously in a nursing home for a short period of time and he states that he will never go back to a nursing home as the care was horrible.     PMH  1. Congestive heart failure functional class II/III  2.. Cardiac catheterization 11/2018 LVEDP 10 mmHg LVEF 40% left main 0% stenosis LAD 20% within previous stent in the distal vessel, mid LAD stent 20% stenosis, left circumflex 30% stenosis RCA less than 20% proximal and distal stenosis.  3. Has tendency for hyperkalemia, have to watch closely. Hyperkalemia was the reason for discontinuation of Entresto  4. Chronic kidney disease stage III  5. Tendency for orthostatic hypotension. Not orthostatic today, blood pressure is at target, patient feels well.  6. Degenerative joint disease, ambulates with a wheeled walker.  7. Increased frequency of urination, partly related to BPH  8. Echocardiogram March 3, 2021 LVEF 40 to 45%, normal RV size and function, " trivial mitral regurgitation 1+ tricuspid regurgitation RVSP 30 mmHg trivial tricuspid regurgitation  9. Biventricular ICD, device interrogation April 2021 showed less than 1% atrial fibrillation burden longest duration 21 hours, this is addressed by EP service  10. Elevated D-dimer 0.7 6 June 2021-CT chest negative for pulmonary embolism  11. Status post injection right rotator cuff for pain.  12. Carotid ultrasound 2018 less than 50% bilateral carotid stenosis  12. Lexiscan Myoview March 2021 no ischemia LVEF 39%  13. chronically elevated troponin  14. seizure disorder   15. Orthostatic hypotension  16. ACE inhibitor/angiotensin receptor blocker/Entresto intolerance due to hyperkalemia  17. BPH  18. Frequent ER visits, for multitude of reasons, lately for accelerated hypertension, chronic dizziness, and now with complaints of chest pain and jaw discomfort. Did not try nitroglycerin but took Tylenol. Very difficult to assess clinically.  19. Agree with the fact that anxiety and/or depression are triggering several of the ER visits.  20. Lexiscan Myoview October 2022-no evidence of ischemia or infarction, LVEF 54%, LVEF was reported to be 39% in March 2021 transient ischemic dilatation 1.0 which is normal.   21.  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  CKD stage IIIb  23.  Echocardiogram March 2021-LVEF 40 to 45%, 1+ tricuspid regurgitation, RVSP 30 mmHg, trivial pulmonary regurgitation, minimal aortic leaflet calcification no evidence of aortic regurgitation peak and mean gradients 8 and 5 respectively  24.  Echocardiogram 12/15/2023-Limited study calcified aortic valve cusps stenotic cyst not assessed due to limited exam, LVEF 60 to 65% reduced RV systolic function mitral annular calcification, mildly dilated left atrium is a limited  "echo and so aortic valve was not assessed.  Evidence of diastolic dysfunction.      Testing Reviewed  6/22/2025 labs; , K+ 24.9, BUN 19, creatinine 1.56, ALT 10, AST 28, WBC 6, Hgb 9.7, platelets 272,    Problem List[1]    Social History[2]    Medical History[3]    Current Medications[4]    Codeine, Lisinopril, Penicillins, and Doxycycline    Family History[5]    Surgical History[6]       Review of systems  Constitutional: No weight loss, fever, chills.  Positive for weakness and fatigue  HEENT: No visual loss, blurred vision, double vision or yellow sclerae  Skin: No rash or itching  Cardiovascular: No chest pain, pressure or discomfort, No palpitations or edema.  Respiratory: No shortness of breath, cough or sputum  Gastrointestinal: No nausea, vomiting or diarrhea. No bloody or dark tarry stools.  Neurological: Positive for occasional lightheadedness.  No headache,  syncope.   Musculoskeletal: No muscle, back pain, joint pain or stiffness.  Hematologic: No anemia, bleeding or bruising.    BP 98/52 (BP Location: Right arm, Patient Position: Sitting)   Pulse 68   Ht 1.702 m (5' 7\")   Wt 66.2 kg (146 lb)   BMI 22.87 kg/m²     Physical Exam  Constitutional: Well developed, awake/alert x 3, no distress.  Head/Neck: No JVD, No bruits  Respiratory/Thorax: patent airways, CTAB, normal breath sounds with good expansion.  Cardiovascular: Regular rate and rhythm,  normal S1 and S2,   Gastrointestinal: Non distended, soft, non-tender, no rebound tenderness or guarding.  Extremities: No cyanosis, edema.   Neurological: Alert and oriented x 3. Moves extremities spontaneous with purpose.  Psychological: Appropriate mood and behavior  Skin: Warm and Dry. No lesions or rashes.     Assessment/Plan  Hypotension; slight improvement from previous office visit 98/52.  He does report weakness and lightheadedness.  Will adjust medications.  Will need to be monitored very closely as he has a history of significant " hypertension  Decrease hydralazine 50 mg 1 tablet 3 times a day  Decrease furosemide to 20 mg 1 tablet 3 days a week Monday/Wednesday/Friday.  Digoxin dose has been decreased to every other day due to CKD during his last hospital admission.  It appears he has not changed the dose.  Hydration has been emphasized  Obtain BMP, CBC prior to next office visit  Follow-up in the office with Dr. Dillon 2-3 weeks or sooner if needed.      Please excuse any errors in grammar or translation related to dictation, voice recognition software was used to prepare this document.         [1]   Patient Active Problem List  Diagnosis    DDD (degenerative disc disease), cervical    Anxiety    Atrial fibrillation (Multi)    Benign enlargement of prostate    Bilateral carotid artery disease    Cardiomyopathy    Chronic right shoulder pain    GERD (gastroesophageal reflux disease)    ICD (implantable cardioverter-defibrillator) in place    Moderate dementia    Peripheral neuropathy    Seizure disorder (Multi)    Systolic congestive heart failure, NYHA class 2    Vitamin B12 deficiency    Vitamin D deficiency disease    Chronic anemia    At high risk for falls    Body mass index (BMI) of 24.0 to 24.9 in adult    CAD (coronary artery disease)    Hypertension    Insomnia    Iron deficiency    Psychosomatic disorder    Combined form of senile cataract    Epiretinal membrane    Benign hypertensive kidney disease with chronic kidney disease stage I through stage IV, or unspecified(403.10)    Aortic systolic murmur on examination    Chronic renal disease, stage IV (Multi)    ACEI/ARB contraindicated    Vitreous hemorrhage, left eye (Multi)    Unilateral emphysema (Multi)    Mixed hyperlipidemia   [2]   Social History  Tobacco Use    Smoking status: Never    Smokeless tobacco: Never   Vaping Use    Vaping status: Never Used   Substance Use Topics    Alcohol use: Not Currently    Drug use: Never   [3]   Past Medical History:  Diagnosis Date     Encounter for follow-up examination after completed treatment for conditions other than malignant neoplasm 07/11/2022    Hospital discharge follow-up    Encounter for follow-up examination after completed treatment for conditions other than malignant neoplasm 05/04/2022    Hospital discharge follow-up    Hypertension     ICD (implantable cardioverter-defibrillator) in place     Insomnia disorder     Personal history of other diseases of the circulatory system 12/07/2022    History of hypertension    Personal history of other endocrine, nutritional and metabolic disease 12/07/2022    History of hyperlipidemia   [4]   Current Outpatient Medications:     aspirin 81 mg EC tablet, Take 1 tablet (81 mg) by mouth once daily., Disp: 90 tablet, Rfl: 3    atorvastatin (Lipitor) 80 mg tablet, Take 1 tablet (80 mg) by mouth once daily at bedtime., Disp: 90 tablet, Rfl: 1    butalbital-acetaminophen-caff -40 mg tablet, Take 1 tablet by mouth every 4 hours if needed for headaches., Disp: , Rfl:     carvedilol (Coreg) 12.5 mg tablet, Take 1 tablet (12.5 mg) by mouth 2 times daily (morning and late afternoon)., Disp: 180 tablet, Rfl: 3    cholecalciferol (Vitamin D-3) 50,000 unit capsule, Take 1 capsule (50,000 Units) by mouth 1 (one) time per week., Disp: 90 capsule, Rfl: 1    dicyclomine (Bentyl) 10 mg capsule, Take 1 capsule (10 mg) by mouth 4 times a day., Disp: 90 capsule, Rfl: 3    divalproex (Depakote) 500 mg EC tablet, Take 1 tablet (500 mg) by mouth 3 times a day. AFTER MEALS., Disp: 90 tablet, Rfl: 0    folic acid (Folvite) 400 mcg tablet, Take 1 tablet (0.4 mg) by mouth once daily., Disp: , Rfl:     latanoprost 0.005 % drops, emulsion, Administer 1 drop into affected eye(s) once daily., Disp: , Rfl:     meclizine (Antivert) 25 mg tablet, Take 1 tablet (25 mg) by mouth 3 times a day as needed for dizziness., Disp: 90 tablet, Rfl: 3    miscellaneous medical supply (Blood Pressure Cuff) misc, 1 each once daily. BP to  be checked out twice daily., Disp: 1 each, Rfl: 1    multivit-min/ferrous fumarate (MULTI VITAMIN ORAL), Take 1 tablet by mouth once daily., Disp: , Rfl:     nitroglycerin (Nitrostat) 0.4 mg SL tablet, Place 1 tablet (0.4 mg) under the tongue every 5 minutes if needed for chest pain., Disp: 90 tablet, Rfl: 3    ondansetron (Zofran) 4 mg tablet, Take 4 tablets (16 mg) by mouth every 8 hours if needed for nausea or vomiting., Disp: , Rfl:     pantoprazole (ProtoNix) 40 mg EC tablet, Take 1 tablet (40 mg) by mouth once daily., Disp: 90 tablet, Rfl: 3    digoxin (Lanoxin) 125 MCG tablet, Take 1 tablet (125 mcg) by mouth every other day., Disp: 45 tablet, Rfl: 3    furosemide (Lasix) 20 mg tablet, Take 1 tablet (20 mg) by mouth once a day on Monday, Wednesday, and Friday., Disp: 45 tablet, Rfl: 2    hydrALAZINE (Apresoline) 50 mg tablet, Take ( 1) 50 mg tablet three times a day, morning, afternoon and evening., Disp: 270 tablet, Rfl: 3  [5]   Family History  Problem Relation Name Age of Onset    No Known Problems Mother      Cancer Father      Other (cardiac disorder) Sister     [6]   Past Surgical History:  Procedure Laterality Date    CT ANGIO NECK  3/10/2023    CT NECK ANGIO W AND WO IV CONTRAST 3/10/2023    CT HEAD ANGIO W AND WO IV CONTRAST  3/10/2023    CT HEAD ANGIO W AND WO IV CONTRAST 3/10/2023    OTHER SURGICAL HISTORY  11/10/2021    Surgery    OTHER SURGICAL HISTORY  11/10/2021    Cataract surgery    OTHER SURGICAL HISTORY  11/10/2021    Inguinal hernia repair    OTHER SURGICAL HISTORY  11/10/2021    Pacemaker insertion    OTHER SURGICAL HISTORY  11/10/2021    Esophagogastroduodenoscopy    OTHER SURGICAL HISTORY  05/04/2022    Prostate surgery    OTHER SURGICAL HISTORY  08/30/2022    Pancreatectomy    OTHER SURGICAL HISTORY  08/30/2022    Prostatectomy    OTHER SURGICAL HISTORY  03/29/2022    Pancreatic surgery    OTHER SURGICAL HISTORY  03/03/2022    Appendectomy

## 2025-07-21 ENCOUNTER — TELEPHONE (OUTPATIENT)
Dept: PRIMARY CARE | Facility: CLINIC | Age: OVER 89
End: 2025-07-21
Payer: MEDICARE
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
## 2025-07-25 PROCEDURE — 93295 DEV INTERROG REMOTE 1/2/MLT: CPT | Performed by: INTERNAL MEDICINE

## 2025-08-05 ENCOUNTER — TELEPHONE (OUTPATIENT)
Dept: PRIMARY CARE | Facility: CLINIC | Age: OVER 89
End: 2025-08-05
Payer: MEDICARE

## 2025-08-05 DIAGNOSIS — L85.3 DRY SKIN DERMATITIS: Primary | ICD-10-CM

## 2025-08-05 RX ORDER — FLUOCINONIDE 0.05 MG/G
OINTMENT TOPICAL 2 TIMES DAILY
Qty: 60 G | Refills: 11 | Status: SHIPPED | OUTPATIENT
Start: 2025-08-05 | End: 2026-08-05

## 2025-08-06 ENCOUNTER — APPOINTMENT (OUTPATIENT)
Dept: CARDIOLOGY | Facility: CLINIC | Age: OVER 89
End: 2025-08-06
Payer: MEDICARE

## 2025-08-20 ENCOUNTER — APPOINTMENT (OUTPATIENT)
Dept: PRIMARY CARE | Facility: CLINIC | Age: OVER 89
End: 2025-08-20
Payer: MEDICARE

## 2025-08-20 VITALS
BODY MASS INDEX: 22.71 KG/M2 | HEIGHT: 67 IN | TEMPERATURE: 98 F | HEART RATE: 76 BPM | WEIGHT: 144.7 LBS | OXYGEN SATURATION: 98 % | DIASTOLIC BLOOD PRESSURE: 52 MMHG | SYSTOLIC BLOOD PRESSURE: 124 MMHG

## 2025-08-20 DIAGNOSIS — G40.909 SEIZURE DISORDER (MULTI): ICD-10-CM

## 2025-08-20 DIAGNOSIS — I10 PRIMARY HYPERTENSION: ICD-10-CM

## 2025-08-20 DIAGNOSIS — I50.22 CHRONIC SYSTOLIC CONGESTIVE HEART FAILURE, NYHA CLASS 2: Primary | ICD-10-CM

## 2025-08-20 DIAGNOSIS — N18.31 STAGE 3A CHRONIC KIDNEY DISEASE (MULTI): ICD-10-CM

## 2025-08-20 DIAGNOSIS — R09.89 LABILE HYPERTENSION: ICD-10-CM

## 2025-08-20 DIAGNOSIS — R14.3 FLATULENCE: ICD-10-CM

## 2025-08-20 DIAGNOSIS — Z95.810 ICD (IMPLANTABLE CARDIOVERTER-DEFIBRILLATOR) IN PLACE: ICD-10-CM

## 2025-08-20 DIAGNOSIS — L20.89 OTHER ATOPIC DERMATITIS: ICD-10-CM

## 2025-08-20 DIAGNOSIS — I25.5 ISCHEMIC CARDIOMYOPATHY: ICD-10-CM

## 2025-08-20 PROCEDURE — 3074F SYST BP LT 130 MM HG: CPT | Performed by: INTERNAL MEDICINE

## 2025-08-20 PROCEDURE — 3078F DIAST BP <80 MM HG: CPT | Performed by: INTERNAL MEDICINE

## 2025-08-20 PROCEDURE — G2211 COMPLEX E/M VISIT ADD ON: HCPCS | Performed by: INTERNAL MEDICINE

## 2025-08-20 PROCEDURE — 1159F MED LIST DOCD IN RCRD: CPT | Performed by: INTERNAL MEDICINE

## 2025-08-20 PROCEDURE — 99214 OFFICE O/P EST MOD 30 MIN: CPT | Performed by: INTERNAL MEDICINE

## 2025-08-20 PROCEDURE — 1160F RVW MEDS BY RX/DR IN RCRD: CPT | Performed by: INTERNAL MEDICINE

## 2025-08-20 RX ORDER — NITROGLYCERIN 0.4 MG/1
0.4 TABLET SUBLINGUAL EVERY 5 MIN PRN
Qty: 30 TABLET | Refills: 3 | Status: SHIPPED | OUTPATIENT
Start: 2025-08-20 | End: 2025-08-20 | Stop reason: SDUPTHER

## 2025-08-20 RX ORDER — DICYCLOMINE HYDROCHLORIDE 10 MG/1
10 CAPSULE ORAL 4 TIMES DAILY
Qty: 90 CAPSULE | Refills: 3 | Status: SHIPPED | OUTPATIENT
Start: 2025-08-20 | End: 2025-08-20 | Stop reason: SDUPTHER

## 2025-08-20 RX ORDER — CARVEDILOL 12.5 MG/1
12.5 TABLET ORAL
Qty: 180 TABLET | Refills: 3 | Status: SHIPPED | OUTPATIENT
Start: 2025-08-20 | End: 2025-08-20 | Stop reason: SDUPTHER

## 2025-08-20 RX ORDER — DIVALPROEX SODIUM 500 MG/1
500 TABLET, DELAYED RELEASE ORAL 3 TIMES DAILY
Qty: 90 TABLET | Refills: 3 | Status: CANCELLED | OUTPATIENT
Start: 2025-08-20 | End: 2025-09-19

## 2025-08-20 ASSESSMENT — PATIENT HEALTH QUESTIONNAIRE - PHQ9
1. LITTLE INTEREST OR PLEASURE IN DOING THINGS: NOT AT ALL
SUM OF ALL RESPONSES TO PHQ9 QUESTIONS 1 AND 2: 1
2. FEELING DOWN, DEPRESSED OR HOPELESS: SEVERAL DAYS

## 2025-08-21 RX ORDER — DICYCLOMINE HYDROCHLORIDE 10 MG/1
10 CAPSULE ORAL 4 TIMES DAILY
Qty: 90 CAPSULE | Refills: 3 | Status: SHIPPED | OUTPATIENT
Start: 2025-08-21

## 2025-08-21 RX ORDER — CARVEDILOL 12.5 MG/1
12.5 TABLET ORAL
Qty: 180 TABLET | Refills: 3 | Status: SHIPPED | OUTPATIENT
Start: 2025-08-21 | End: 2026-08-21

## 2025-08-21 RX ORDER — NITROGLYCERIN 0.4 MG/1
0.4 TABLET SUBLINGUAL EVERY 5 MIN PRN
Qty: 30 TABLET | Refills: 3 | Status: SHIPPED | OUTPATIENT
Start: 2025-08-21 | End: 2026-08-21

## 2025-08-27 ENCOUNTER — APPOINTMENT (OUTPATIENT)
Dept: CARDIOLOGY | Facility: CLINIC | Age: OVER 89
End: 2025-08-27
Payer: MEDICARE

## 2025-08-27 VITALS
WEIGHT: 141.2 LBS | DIASTOLIC BLOOD PRESSURE: 52 MMHG | HEIGHT: 66 IN | SYSTOLIC BLOOD PRESSURE: 108 MMHG | BODY MASS INDEX: 22.69 KG/M2 | HEART RATE: 76 BPM

## 2025-08-27 DIAGNOSIS — I25.118 CORONARY ARTERY DISEASE OF NATIVE ARTERY OF NATIVE HEART WITH STABLE ANGINA PECTORIS: ICD-10-CM

## 2025-08-27 DIAGNOSIS — N18.31 STAGE 3A CHRONIC KIDNEY DISEASE (MULTI): ICD-10-CM

## 2025-08-27 DIAGNOSIS — Z95.810 ICD (IMPLANTABLE CARDIOVERTER-DEFIBRILLATOR) IN PLACE: ICD-10-CM

## 2025-08-27 DIAGNOSIS — I48.0 PAROXYSMAL ATRIAL FIBRILLATION (MULTI): ICD-10-CM

## 2025-08-27 DIAGNOSIS — Z91.81 AT HIGH RISK FOR FALLS: ICD-10-CM

## 2025-08-27 DIAGNOSIS — I50.22 CHRONIC SYSTOLIC CONGESTIVE HEART FAILURE, NYHA CLASS 2: ICD-10-CM

## 2025-08-27 DIAGNOSIS — I10 PRIMARY HYPERTENSION: ICD-10-CM

## 2025-08-27 DIAGNOSIS — I25.5 ISCHEMIC CARDIOMYOPATHY: ICD-10-CM

## 2025-08-27 DIAGNOSIS — E78.2 MIXED HYPERLIPIDEMIA: ICD-10-CM

## 2025-08-27 PROCEDURE — 1160F RVW MEDS BY RX/DR IN RCRD: CPT | Performed by: INTERNAL MEDICINE

## 2025-08-27 PROCEDURE — 99214 OFFICE O/P EST MOD 30 MIN: CPT | Performed by: INTERNAL MEDICINE

## 2025-08-27 PROCEDURE — 3078F DIAST BP <80 MM HG: CPT | Performed by: INTERNAL MEDICINE

## 2025-08-27 PROCEDURE — G2211 COMPLEX E/M VISIT ADD ON: HCPCS | Performed by: INTERNAL MEDICINE

## 2025-08-27 PROCEDURE — 1159F MED LIST DOCD IN RCRD: CPT | Performed by: INTERNAL MEDICINE

## 2025-08-27 PROCEDURE — 1036F TOBACCO NON-USER: CPT | Performed by: INTERNAL MEDICINE

## 2025-08-27 PROCEDURE — 3074F SYST BP LT 130 MM HG: CPT | Performed by: INTERNAL MEDICINE

## 2025-08-27 RX ORDER — ATORVASTATIN CALCIUM 80 MG/1
80 TABLET, FILM COATED ORAL NIGHTLY
Qty: 90 TABLET | Refills: 3 | Status: SHIPPED | OUTPATIENT
Start: 2025-08-27 | End: 2026-02-23

## 2025-08-27 RX ORDER — DOCUSATE SODIUM 100 MG/1
100 CAPSULE, LIQUID FILLED ORAL DAILY
COMMUNITY

## 2025-08-27 RX ORDER — FUROSEMIDE 20 MG/1
20 TABLET ORAL
Qty: 45 TABLET | Refills: 3 | Status: SHIPPED | OUTPATIENT
Start: 2025-08-27

## 2025-08-27 RX ORDER — HYDRALAZINE HYDROCHLORIDE 25 MG/1
25 TABLET, FILM COATED ORAL 2 TIMES DAILY
Qty: 180 TABLET | Refills: 3 | Status: SHIPPED | OUTPATIENT
Start: 2025-08-27 | End: 2026-08-27

## 2025-08-27 RX ORDER — CARVEDILOL 6.25 MG/1
6.25 TABLET ORAL
Qty: 180 TABLET | Refills: 3 | Status: SHIPPED | OUTPATIENT
Start: 2025-08-27 | End: 2026-08-27

## 2025-08-27 RX ORDER — DIGOXIN 125 MCG
125 TABLET ORAL EVERY OTHER DAY
Qty: 45 TABLET | Refills: 3 | Status: SHIPPED | OUTPATIENT
Start: 2025-08-27 | End: 2026-08-27

## 2025-08-27 RX ORDER — ASPIRIN 81 MG/1
81 TABLET ORAL DAILY
COMMUNITY

## 2025-08-27 RX ORDER — FOLIC ACID 0.8 MG
0.8 TABLET ORAL DAILY
COMMUNITY

## 2025-09-03 ENCOUNTER — TELEPHONE (OUTPATIENT)
Dept: PRIMARY CARE | Facility: CLINIC | Age: OVER 89
End: 2025-09-03
Payer: MEDICARE

## 2025-09-24 ENCOUNTER — APPOINTMENT (OUTPATIENT)
Dept: CARDIOLOGY | Facility: CLINIC | Age: OVER 89
End: 2025-09-24
Payer: MEDICARE

## 2025-11-18 ENCOUNTER — APPOINTMENT (OUTPATIENT)
Dept: CARDIOLOGY | Facility: CLINIC | Age: OVER 89
End: 2025-11-18
Payer: MEDICARE

## 2025-11-20 ENCOUNTER — APPOINTMENT (OUTPATIENT)
Dept: PRIMARY CARE | Facility: CLINIC | Age: OVER 89
End: 2025-11-20
Payer: MEDICARE

## 2025-12-01 ENCOUNTER — APPOINTMENT (OUTPATIENT)
Dept: PRIMARY CARE | Facility: CLINIC | Age: OVER 89
End: 2025-12-01
Payer: MEDICARE

## 2025-12-22 ENCOUNTER — APPOINTMENT (OUTPATIENT)
Dept: CARDIOLOGY | Facility: CLINIC | Age: OVER 89
End: 2025-12-22
Payer: MEDICARE

## 2026-03-03 ENCOUNTER — APPOINTMENT (OUTPATIENT)
Dept: DERMATOLOGY | Facility: CLINIC | Age: OVER 89
End: 2026-03-03
Payer: MEDICARE